# Patient Record
Sex: FEMALE | Race: WHITE | NOT HISPANIC OR LATINO | Employment: FULL TIME | URBAN - METROPOLITAN AREA
[De-identification: names, ages, dates, MRNs, and addresses within clinical notes are randomized per-mention and may not be internally consistent; named-entity substitution may affect disease eponyms.]

---

## 2017-08-16 ENCOUNTER — OFFICE VISIT (OUTPATIENT)
Dept: FAMILY MEDICINE | Facility: CLINIC | Age: 32
End: 2017-08-16
Payer: COMMERCIAL

## 2017-08-16 VITALS
SYSTOLIC BLOOD PRESSURE: 112 MMHG | HEART RATE: 77 BPM | BODY MASS INDEX: 20.65 KG/M2 | WEIGHT: 109.4 LBS | DIASTOLIC BLOOD PRESSURE: 72 MMHG | TEMPERATURE: 99.4 F | HEIGHT: 61 IN

## 2017-08-16 DIAGNOSIS — Z30.011 ENCOUNTER FOR INITIAL PRESCRIPTION OF CONTRACEPTIVE PILLS: ICD-10-CM

## 2017-08-16 DIAGNOSIS — Z00.00 ROUTINE GENERAL MEDICAL EXAMINATION AT A HEALTH CARE FACILITY: Primary | ICD-10-CM

## 2017-08-16 PROCEDURE — 99395 PREV VISIT EST AGE 18-39: CPT | Performed by: FAMILY MEDICINE

## 2017-08-16 RX ORDER — NORGESTIMATE AND ETHINYL ESTRADIOL 7DAYSX3 LO
1 KIT ORAL DAILY
Qty: 84 TABLET | Refills: 3 | Status: SHIPPED | OUTPATIENT
Start: 2017-08-16 | End: 2018-07-05

## 2017-08-16 NOTE — MR AVS SNAPSHOT
After Visit Summary   8/16/2017    Lluvia Silver    MRN: [id number]           Patient Information     Date Of Birth          1985        Visit Information        Provider Department      8/16/2017 8:00 AM Gerald Arreola MD [facility]        Today's Diagnoses     Routine general medical examination at a health care facility    -  1    Encounter for initial prescription of contraceptive pills          Care Instructions      Preventive Health Recommendations  Female Ages 26 - 39  Yearly exam:   See your health care provider every year in order to    Review health changes.     Discuss preventive care.      Review your medicines if you your doctor has prescribed any.    Until age 30: Get a Pap test every three years (more often if you have had an abnormal result).    After age 30: Talk to your doctor about whether you should have a Pap test every 3 years or have a Pap test with HPV screening every 5 years.   You do not need a Pap test if your uterus was removed (hysterectomy) and you have not had cancer.  You should be tested each year for STDs (sexually transmitted diseases), if you're at risk.   Talk to your provider about how often to have your cholesterol checked.  If you are at risk for diabetes, you should have a diabetes test (fasting glucose).  Shots: Get a flu shot each year. Get a tetanus shot every 10 years.   Nutrition:     Eat at least 5 servings of fruits and vegetables each day.    Eat whole-grain bread, whole-wheat pasta and brown rice instead of white grains and rice.    Talk to your provider about Calcium and Vitamin D.     Lifestyle    Exercise at least 150 minutes a week (30 minutes a day, 5 days of the week). This will help you control your weight and prevent disease.    Limit alcohol to one drink per day.    No smoking.     Wear sunscreen to prevent skin cancer.    See your dentist every six months for an exam and cleaning.            Follow-ups after your  "visit        Who to contact     If you have questions or need follow up information about today's clinic visit or your schedule please contact [facility] directly at [phone].  Normal or non-critical lab and imaging results will be communicated to you by MyChart, letter or phone within 4 business days after the clinic has received the results. If you do not hear from us within 7 days, please contact the clinic through MyChart or phone. If you have a critical or abnormal lab result, we will notify you by phone as soon as possible.  Submit refill requests through [facility] or call your pharmacy and they will forward the refill request to us. Please allow 3 business days for your refill to be completed.          Additional Information About Your Visit        [employer] Information     [facility] [facility] lets you send messages to your doctor, view your test results, renew your prescriptions, schedule appointments and more. To sign up, go to [email or web address] . Click on \"Log in\" on the left side of the screen, which will take you to the Welcome page. Then click on \"Sign up Now\" on the right side of the page.     You will be asked to enter the access code listed below, as well as some personal information. Please follow the directions to create your username and password.     Your access code is: [email or web address]  Expires: 2017  8:17 AM     Your access code will  in 90 days. If you need help or a new code, please call your [place] clinic or [phone].        Care EveryWhere ID     This is your Care EveryWhere ID. This could be used by other organizations to access your [facility] medical records  [id number]        Your Vitals Were     Pulse Temperature Height Last Period BMI (Body Mass Index)       77 99.4  F (37.4  C) (Oral) 5' 1\" (1.549 m) 2017 20.67 kg/m2        Blood Pressure from Last 3 Encounters:   17 112/72   08/15/16 108/68   10/28/15 118/74    Weight from Last 3 Encounters: "   08/16/17 109 lb 6.4 oz (49.6 kg)   08/15/16 108 lb 9.6 oz (49.3 kg)   10/28/15 109 lb 3.2 oz (49.5 kg)              Today, you had the following     No orders found for display         Today's Medication Changes          These changes are accurate as of: 8/16/17  8:17 AM.  If you have any questions, ask your nurse or doctor.               Start taking these medicines.        Dose/Directions    norgestim-eth estrad triphasic 0.18/0.215/0.25 MG-25 MCG per tablet   Commonly known as:  ORTHO TRI-CYCLEN LO   Used for:  Encounter for initial prescription of contraceptive pills   Started by:  Gerald Arreola MD        Dose:  1 tablet   Take 1 tablet by mouth daily   Quantity:  84 tablet   Refills:  3            Where to get your medicines      These medications were sent to [facility]/pharmacy #[id number] - [place], [place] - [address]  [address], [place] [address]     Phone:  [phone]     norgestim-eth estrad triphasic 0.18/0.215/0.25 MG-25 MCG per tablet                Primary Care Provider Office Phone # Fax #    Gerald Arreola [phone] [address] [facility] [address]        Equal Access to Services     HERMAN SHAHID AH: Hadii aad ku hadasho Soomaali, waaxda luqadaha, qaybta kaalmada adeegyada, waxay idiin hayaan bart hurtado. So [facility] [phone].    ATENCIÓN: Si habla español, tiene a caldwell disposición servicios gratuitos de asistencia lingüística. Llame al [phone].    We comply with applicable federal civil rights laws and [place] laws. We do not discriminate on the basis of race, color, national origin, age, disability sex, sexual orientation or gender identity.            Thank you!     Thank you for choosing [facility]  for your care. Our goal is always to provide you with excellent care. Hearing back from our patients is one way we can continue to improve our services. Please take a few minutes to complete the written  survey that you may receive in the mail after your visit with us. Thank you!             Your Updated Medication List - Protect others around you: Learn how to safely use, store and throw away your medicines at www.disposemymeds.org.          This list is accurate as of: 8/16/17  8:17 AM.  Always use your most recent med list.                   Brand Name Dispense Instructions for use Diagnosis    desogestrel-ethinyl estradiol 0.1/0.125/0.15 -0.025 MG per tablet    DARLEEN, VELIVET    84 tablet    Take 1 tablet by mouth daily    Menometrorrhagia       norgestim-eth estrad triphasic 0.18/0.215/0.25 MG-25 MCG per tablet    ORTHO TRI-CYCLEN LO    84 tablet    Take 1 tablet by mouth daily    Encounter for initial prescription of contraceptive pills

## 2017-08-16 NOTE — NURSING NOTE
"Chief Complaint   Patient presents with     Physical       Initial /72  Pulse 77  Temp 99.4  F (37.4  C) (Oral)  Ht 5' 1\" (1.549 m)  Wt 109 lb 6.4 oz (49.6 kg)  LMP 07/17/2017  BMI 20.67 kg/m2 Estimated body mass index is 20.67 kg/(m^2) as calculated from the following:    Height as of this encounter: 5' 1\" (1.549 m).    Weight as of this encounter: 109 lb 6.4 oz (49.6 kg).  Medication Reconciliation: complete  Maximiliano Castillo CMA    "

## 2017-08-16 NOTE — PROGRESS NOTES
SUBJECTIVE:   CC: Lluvia Silver is an 31 year old woman who presents for preventive health visit.     Healthy Habits:    Do you get at least three servings of calcium containing foods daily (dairy, green leafy vegetables, etc.)? No, 2    Amount of exercise or daily activities, outside of work: 5 day(s) per week    Problems taking medications regularly No    Medication side effects: No    Have you had an eye exam in the past two years? yes    Do you see a dentist twice per year? yes    Do you have sleep apnea, excessive snoring or daytime drowsiness?no               Today's PHQ-2 Score: PHQ-2 ( 1999 Pf[employer]) 8/16/2017 8/15/2016   Q1: Little interest or pleasure in doing things 0 0   Q2: Feeling down, depressed or hopeless 0 0   PHQ-2 Score 0 0         Abuse: Current or Past(Physical, Sexual or Emotional)- No  Do you feel safe in your environment - Yes  Social History   Substance Use Topics     Smoking status: Never Smoker     Smokeless tobacco: Never Used     Alcohol use Yes     The patient does not drink >3 drinks per day nor >7 drinks per week.    Reviewed orders with patient.  Reviewed health maintenance and updated orders accordingly - Yes       Mammogram not appropriate for this patient based on age.    Pertinent mammograms are reviewed under the imaging tab.  History of abnormal Pap smear: NO - age 21-29 PAP every 3 years recommended    Reviewed and updated as needed this visit by clinical staff  Tobacco  Allergies  Meds  Med Hx  Surg Hx  Fam Hx  Soc Hx        Reviewed and updated as needed this visit by Provider          ROS:  C: NEGATIVE for fever, chills, change in weight  I: NEGATIVE for worrisome rashes, moles or lesions  E: NEGATIVE for vision changes or irritation  ENT: NEGATIVE for ear, mouth and throat problems  R: NEGATIVE for significant cough or SOB  B: NEGATIVE for masses, tenderness or discharge  CV: NEGATIVE for chest pain, palpitations or peripheral edema  GI: NEGATIVE for nausea,  "abdominal pain, heartburn, or change in bowel habits  : NEGATIVE for unusual urinary or vaginal symptoms. Periods are regular.  M: NEGATIVE for significant arthralgias or myalgia  N: NEGATIVE for weakness, dizziness or paresthesias  P: NEGATIVE for changes in mood or affect    OBJECTIVE:   /72  Pulse 77  Temp 99.4  F (37.4  C) (Oral)  Ht 5' 1\" (1.549 m)  Wt 109 lb 6.4 oz (49.6 kg)  LMP 07/17/2017  BMI 20.67 kg/m2  EXAM:  GENERAL: healthy, alert and no distress  EYES: Eyes grossly normal to inspection, PERRL and conjunctivae and sclerae normal  HENT: ear canals and TM's normal, nose and mouth without ulcers or lesions  NECK: no adenopathy, no asymmetry, masses, or scars and thyroid normal to palpation  RESP: lungs clear to auscultation - no rales, rhonchi or wheezes  CV: regular rate and rhythm, normal S1 S2, no S3 or S4, no murmur, click or rub, no peripheral edema and peripheral pulses strong  ABDOMEN: soft, nontender, no hepatosplenomegaly, no masses and bowel sounds normal  MS: no gross musculoskeletal defects noted, no edema  SKIN: no suspicious lesions or rashes  NEURO: Normal strength and tone, mentation intact and speech normal  PSYCH: mentation appears normal, affect normal/bright    ASSESSMENT/PLAN:       ICD-10-CM    1. Routine general medical examination at a health care facility Z00.00    2. Encounter for initial prescription of contraceptive pills Z30.011 norgestim-eth estrad triphasic (ORTHO TRI-CYCLEN LO) 0.18/0.215/0.25 MG-25 MCG per tablet       COUNSELING:   Reviewed preventive health counseling, as reflected in patient instructions       Regular exercise       Healthy diet/nutrition         reports that she has never smoked. She has never used smokeless tobacco.    Estimated body mass index is 20.67 kg/(m^2) as calculated from the following:    Height as of this encounter: 5' 1\" (1.549 m).    Weight as of this encounter: 109 lb 6.4 oz (49.6 kg).         Counseling Resources:  ATP IV " Guidelines  Pooled Cohorts Equation Calculator  Breast Cancer Risk Calculator  FRAX Risk Assessment  ICSI Preventive Guidelines  Dietary Guidelines for Americans, 2010  [employer]'s MyPlate  ASA Prophylaxis  Lung CA Screening    Gerald Arreola MD  [facility]

## 2017-09-02 DIAGNOSIS — N92.1 MENOMETRORRHAGIA: ICD-10-CM

## 2018-07-05 ENCOUNTER — OFFICE VISIT (OUTPATIENT)
Dept: OBGYN | Facility: CLINIC | Age: 33
End: 2018-07-05
Payer: COMMERCIAL

## 2018-07-05 VITALS
HEIGHT: 61 IN | DIASTOLIC BLOOD PRESSURE: 70 MMHG | SYSTOLIC BLOOD PRESSURE: 110 MMHG | WEIGHT: 108 LBS | BODY MASS INDEX: 20.39 KG/M2

## 2018-07-05 DIAGNOSIS — Z30.011 ENCOUNTER FOR INITIAL PRESCRIPTION OF CONTRACEPTIVE PILLS: ICD-10-CM

## 2018-07-05 DIAGNOSIS — M79.641 RIGHT HAND PAIN: ICD-10-CM

## 2018-07-05 DIAGNOSIS — R21 SKIN RASH: ICD-10-CM

## 2018-07-05 DIAGNOSIS — Z01.419 ENCOUNTER FOR GYNECOLOGICAL EXAMINATION WITHOUT ABNORMAL FINDING: Primary | ICD-10-CM

## 2018-07-05 PROCEDURE — 99385 PREV VISIT NEW AGE 18-39: CPT | Performed by: OBSTETRICS & GYNECOLOGY

## 2018-07-05 RX ORDER — NORGESTIMATE AND ETHINYL ESTRADIOL 7DAYSX3 LO
1 KIT ORAL DAILY
Qty: 84 TABLET | Refills: 4 | Status: SHIPPED | OUTPATIENT
Start: 2018-07-05 | End: 2019-07-30

## 2018-07-05 RX ORDER — CETIRIZINE HYDROCHLORIDE 10 MG/1
10 TABLET ORAL DAILY
COMMUNITY
End: 2020-08-18

## 2018-07-05 ASSESSMENT — ANXIETY QUESTIONNAIRES
7. FEELING AFRAID AS IF SOMETHING AWFUL MIGHT HAPPEN: NOT AT ALL
1. FEELING NERVOUS, ANXIOUS, OR ON EDGE: NOT AT ALL
GAD7 TOTAL SCORE: 0
6. BECOMING EASILY ANNOYED OR IRRITABLE: NOT AT ALL
2. NOT BEING ABLE TO STOP OR CONTROL WORRYING: NOT AT ALL
5. BEING SO RESTLESS THAT IT IS HARD TO SIT STILL: NOT AT ALL
IF YOU CHECKED OFF ANY PROBLEMS ON THIS QUESTIONNAIRE, HOW DIFFICULT HAVE THESE PROBLEMS MADE IT FOR YOU TO DO YOUR WORK, TAKE CARE OF THINGS AT HOME, OR GET ALONG WITH OTHER PEOPLE: NOT DIFFICULT AT ALL
3. WORRYING TOO MUCH ABOUT DIFFERENT THINGS: NOT AT ALL

## 2018-07-05 ASSESSMENT — PATIENT HEALTH QUESTIONNAIRE - PHQ9: 5. POOR APPETITE OR OVEREATING: NOT AT ALL

## 2018-07-05 NOTE — MR AVS SNAPSHOT
After Visit Summary   7/5/2018    Lluvia Silver    MRN: [id number]           Patient Information     Date Of Birth          1985        Visit Information        Provider Department      7/5/2018 4:00 PM Kayce Beatty MD [facility]        Today's Diagnoses     Encounter for gynecological examination without abnormal finding    -  1    Skin rash        Right hand pain        Encounter for initial prescription of contraceptive pills           Follow-ups after your visit        Additional Services     DERMATOLOGY REFERRAL       Your provider has referred you to: [facility] [phone]   [email or web address]    Please be aware that coverage of these services is subject to the terms and limitations of your health insurance plan.  Call member services at your health plan with any benefit or coverage questions.      Please bring the following with you to your appointment:    (1) Any X-Rays, CTs or MRIs which have been performed.  Contact the facility where they were done to arrange for  prior to your scheduled appointment.    (2) List of current medications  (3) This referral request   (4) Any documents/labs given to you for this referral            [facility] PT, HAND, AND CHIROPRACTIC REFERRAL       **This order will print in the [facility] Scheduling Office**    Physical Therapy, Hand Therapy and Chiropractic Care are available through:    *[facility]  *[facility]  *[facility]    Call one number to schedule at any of the above locations: [phone].    Your provider has referred you to: Hand Therapy    Indication/Reason for Referral: Hand Pain  Onset of Illness: months  Therapy Orders: Evaluate and Treat  Special Programs: None  Special Request: None    Víctor Renee      Additional Comments for the Therapist or Chiropractor: Right hand pain when she bears weight on  "it.    Please be aware that coverage of these services is subject to the terms and limitations of your health insurance plan.  Call member services at your health plan with any benefit or coverage questions.      Please bring the following to your appointment:    *Your personal calendar for scheduling future appointments  *Comfortable clothing                  Who to contact     If you have questions or need follow up information about today's clinic visit or your schedule please contact [facility] directly at [phone].  Normal or non-critical lab and imaging results will be communicated to you by [employer], letter or phone within 4 business days after the clinic has received the results. If you do not hear from us within 7 days, please contact the clinic through [facility] or phone. If you have a critical or abnormal lab result, we will notify you by phone as soon as possible.  Submit refill requests through [employer] or call your pharmacy and they will forward the refill request to us. Please allow 3 business days for your refill to be completed.          Additional Information About Your Visit        [employer] Information     [employer] lets you send messages to your doctor, view your test results, renew your prescriptions, schedule appointments and more. To sign up, go to [email or web address] . Click on \"Log in\" on the left side of the screen, which will take you to the Welcome page. Then click on \"Sign up Now\" on the right side of the page.     You will be asked to enter the access code listed below, as well as some personal information. Please follow the directions to create your username and password.     Your access code is: [email or web address]  Expires: 10/3/2018  3:43 PM     Your access code will  in 90 days. If you need help or a new code, please call your [facility] or [phone].        Care EveryWhere ID     This is your Care EveryWhere ID. This could be used by other " "organizations to access your [place] [facility] medical records  [id number]        Your Vitals Were     Height Last Period BMI (Body Mass Index)             5' 1\" (1.549 m) 06/13/2018 20.41 kg/m2          Blood Pressure from Last 3 Encounters:   07/05/18 110/70   08/16/17 112/72   08/15/16 108/68    Weight from Last 3 Encounters:   07/05/18 108 lb (49 kg)   08/16/17 109 lb 6.4 oz (49.6 kg)   08/15/16 108 lb 9.6 oz (49.3 kg)              We Performed the Following     DERMATOLOGY REFERRAL     [facility], HAND, AND CHIR[facility]RACTIC REFERRAL          Where to get your medicines      These medications were sent to [facility]/pharmacy #[id number] - [place], [place] - [address]  [address], [place] [address]     Phone:  [phone]     norgestim-eth estrad triphasic 0.18/0.215/0.25 MG-25 MCG per tablet          Primary Care Provider Office Phone # Fax #    Gerald Arreola [phone] [address] [facility] [address]        Equal Access to Services     HERMAN SHAHID AH: Hadii tiffanie alejoo Somadiali, waaxda luqadaha, qaybta kaalmada adeegyada, jacob hurtado. So [facility] [phone].    ATENCIÓN: Si habla español, tiene a caldwell disposición servicios gratuitos de asistencia lingüística. [facility] [phone].    We comply with applicable federal civil rights laws and [place] laws. We do not discriminate on the basis of race, color, national origin, age, disability, sex, sexual orientation, or gender identity.            Thank you!     Thank you for choosing [facility]  for your care. Our goal is always to provide you with excellent care. Hearing back from our patients is one way we can continue to improve our services. Please take a few minutes to complete the written survey that you may receive in the mail after your visit with us. Thank you!             Your Updated Medication List - Protect others around you: Learn how to safely use, store and " throw away your medicines at www.disposemymeds.org.          This list is accurate as of 7/5/18  4:36 PM.  Always use your most recent med list.                   Brand Name Dispense Instructions for use Diagnosis    cetirizine 10 MG tablet    zyrTEC     Take 10 mg by mouth daily        norgestim-eth estrad triphasic 0.18/0.215/0.25 MG-25 MCG per tablet    ORTHO TRI-CYCLEN LO    84 tablet    Take 1 tablet by mouth daily    Encounter for initial prescription of contraceptive pills

## 2018-07-05 NOTE — PROGRESS NOTES
Lluvia is a 32 year old G0. female who presents for annual exam.     Besides routine health maintenance, she has several health concerns today .    HPI:  The patient's PCP is Gerald Arreola MD  She is tolerating her OCP well and would like a refill. She lives with a room mate and feels safe at home.  She works as a  and reports a high amount of stress from the job. A few weeks ago she started to have pain in her right hand when she puts weight on it when rising. She does not recall any injuries to her hand recently. She has also noted the recurrence of a rash on her chest and on her back that is not pruritis and seems to worsen with showering. She denies any new soaps or new lotions.    GYNECOLOGIC HISTORY:    Patient's last menstrual period was 2018.  Her current contraception method is: oral contraceptives.  She  reports that she has never smoked. She has never used smokeless tobacco.    Patient is sexually active.  STD testing offered?  Declined  Last PHQ-9 score on record =   PHQ-9 SCORE 2018   Total Score 0     Last GAD7 score on record =   ILIR-7 SCORE 2018   Total Score 0     Alcohol Score = 2    HEALTH MAINTENANCE:  Cholesterol: (  Cholesterol   Date Value Ref Range Status   2014 188 <200 mg/dL Final     Comment:     LDL Cholesterol is the primary guide to therapy.   The NCEP recommends further evaluation of: patients with cholesterol greater   than 200 mg/dL if additional risk factors are present, cholesterol greater   than   240 mg/dL, triglycerides greater than 150 mg/dL, or HDL less than 40 mg/dL.     Last Mammo: N/A, Result: not applicable, Next Mammo: Age 42  Pap: 8/15/16 NIL HPV Neg  Lab Results   Component Value Date    PAP NIL 08/15/2016    PAP ASC-US 2015    PAP NIL 2012   Colonoscopy:  N/A, Result: not applicable, Next Colonoscopy: Age 52  Dexa:  Never    Health maintenance updated:  yes    HISTORY:  Obstetric History       T0      L0      "SAB0   TAB0   Ectopic0   Multiple0   Live Births0           Patient Active Problem List   Diagnosis     CARDIOVASCULAR SCREENING; LDL GOAL LESS THAN 160     Menometrorrhagia     Acne     Scoliosis     ASCUS with positive high risk HPV cervical     Past Surgical History:   Procedure Laterality Date     NO HISTORY OF SURGERY        Social History   Substance Use Topics     Smoking status: Never Smoker     Smokeless tobacco: Never Used     Alcohol use Yes      Problem (# of Occurrences) Relation (Name,Age of Onset)    Alzheimer Disease (1) Maternal Grandmother    Chronic Obstructive Pulmonary Disease (1) Maternal Grandfather    HEART DISEASE (2) Maternal Grandfather, Paternal Grandfather    Leukemia (1) Maternal Grandfather            Current Outpatient Prescriptions   Medication Sig     cetirizine (ZYRTEC) 10 MG tablet Take 10 mg by mouth daily     norgestim-eth estrad triphasic (ORTHO TRI-CYCLEN LO) 0.18/0.215/0.25 MG-25 MCG per tablet Take 1 tablet by mouth daily     [DISCONTINUED] norgestim-eth estrad triphasic (ORTHO TRI-CYCLEN LO) 0.18/0.215/0.25 MG-25 MCG per tablet Take 1 tablet by mouth daily     No current facility-administered medications for this visit.      No Known Allergies    Past medical, surgical, social and family histories were reviewed and updated in EPIC.    ROS:   12 point review of systems negative other than symptoms noted below.  Skin: Rash    EXAM:  /70  Ht 5' 1\" (1.549 m)  Wt 108 lb (49 kg)  LMP 06/13/2018  BMI 20.41 kg/m2   BMI: Body mass index is 20.41 kg/(m^2).    PHYSICAL EXAM:  Constitutional:  Appearance: Well nourished, well developed, alert, in no acute distress  Neck:  Lymph Nodes:  No lymphadenopathy present    Thyroid:  Gland size normal, nontender, no nodules or masses present on palpation  Chest:  Respiratory Effort:  Breathing unlabored, CTAB  Cardiovascular:    Heart: Auscultation:  Regular rate, normal rhythm, no murmurs present  Breasts: Palpation of Breasts and " Axillae:  No masses present on palpation, no breast tenderness., Axillary Lymph Nodes:  No lymphadenopathy present. and No nodularity, asymmetry or nipple discharge bilaterally.  Gastrointestinal:   Abdominal Examination:  Abdomen nontender to palpation, tone normal without rigidity or guarding, no masses present, umbilicus without lesions   Liver and Spleen:  No hepatomegaly present, liver nontender to palpation    Hernias:  No hernias present  Lymphatic: Lymph Nodes:  No other lymphadenopathy present  Skin:  General Inspection:  scattered hypopigmented rash on her back and on her chest along her ribs.    Genitalia and Groin:  No rashes present, no lesions present, no areas of  discoloration, no masses present  Neurologic/Psychiatric:    Mental Status:  Oriented X3     Pelvic Exam:  External Genitalia:     Normal appearance for age, no discharge present, no tenderness present, no inflammatory lesions present, color normal  Vagina:     Normal vault. Non-tender  Bladder:     Nontender to palpation  Urethra:   Urethral Body:  Urethra palpation normal, urethra structural support normal   Urethral Meatus:  No erythema or lesions present  Cervix:     Non-tender  Uterus:     Uterus: firm, normal sized and nontender, anteverted in position.   Adnexa:     No adnexal tenderness present, no adnexal masses present  Perineum:     Perineum within normal limits, no evidence of trauma, no rashes or skin lesions present  Genitalia and Groin:     No rashes present, no lesions present, no areas of discoloration, no masses present      COUNSELING:   Reviewed preventive health counseling, as reflected in patient instructions       Contraception    BMI: Body mass index is 20.41 kg/(m^2).      ASSESSMENT:  32 year old female with satisfactory annual exam.    ICD-10-CM    1. Encounter for gynecological examination without abnormal finding Z01.419    2. Skin rash R21 DERMATOLOGY REFERRAL   3. Right hand pain M79.641 [facility], [facility]  CHIROPRACTIC REFERRAL   4. Encounter for initial prescription of contraceptive pills Z30.011 norgestim-eth estrad triphasic (ORTHO TRI-CYCLEN LO) 0.18/0.215/0.25 MG-25 MCG per tablet       PLAN:  Pap smear indicated in August of 2019 given her prior abnormal pap in 2015 with ASCUS/HR HPV    OK to continue on current OCP.    Right hand pain: PT hand referral placed. Patient asked to call in for an appointment if she is not contacted in 1 week.    Referral placed for Dermatology. Unknown etiology of the rash on her skin. Will defer to a Dermatologist for diagnosis and treatment if needed.    Kayce Beatty MD

## 2018-07-06 ASSESSMENT — ANXIETY QUESTIONNAIRES: GAD7 TOTAL SCORE: 0

## 2018-07-06 ASSESSMENT — PATIENT HEALTH QUESTIONNAIRE - PHQ9: SUM OF ALL RESPONSES TO PHQ QUESTIONS 1-9: 0

## 2018-07-16 ENCOUNTER — THERAPY VISIT (OUTPATIENT)
Dept: OCCUPATIONAL THERAPY | Facility: CLINIC | Age: 33
End: 2018-07-16
Payer: COMMERCIAL

## 2018-07-16 DIAGNOSIS — M79.641 PAIN OF RIGHT HAND: Primary | ICD-10-CM

## 2018-07-16 PROCEDURE — 97140 MANUAL THERAPY 1/> REGIONS: CPT | Mod: GO | Performed by: OCCUPATIONAL THERAPIST

## 2018-07-16 PROCEDURE — 97110 THERAPEUTIC EXERCISES: CPT | Mod: GO | Performed by: OCCUPATIONAL THERAPIST

## 2018-07-16 PROCEDURE — 97112 NEUROMUSCULAR REEDUCATION: CPT | Mod: GO | Performed by: OCCUPATIONAL THERAPIST

## 2018-07-16 PROCEDURE — 97165 OT EVAL LOW COMPLEX 30 MIN: CPT | Mod: GO | Performed by: OCCUPATIONAL THERAPIST

## 2018-07-16 NOTE — PROGRESS NOTES
Hand Therapy Initial Evaluation  Current Date:  7/16/2018    Subjective:  Lluvia Silver is a 32 year old R hand dominant female.    Diagnosis: Hand pain  DOI:  Unsure, began mid May      Patient reports symptoms of pain of the R hand, wrist and forearm which occurred due to Unsure. Since onset symptoms are gradually getting worse. Special tests:  none.  Previous treatment: none. General health as reported by patient is good.  Pertinent medical history includes: Migraines/Headaches, Change in Skin Color, scoliosis.  Medical allergies: none.  Surgical history: none.  Medication history: birth control, allergy.    Occupational Profile Information:  Current occupation is   Currently working in normal job without restrictions  Job Tasks: Computer Work, Driving, Prolonged Sitting, Repetitive Tasks  Prior functional level:  no limitations  Barriers include:none  Mobility: No difficulty  Transportation: drives  Leisure activities/hobbies: yoga, working out, side business with [employer]    Functional Outcome Measure:   Upper Extremity Functional Index Score:  SCORE:   Column Totals: /80: 69   (A lower score indicates greater disability.)    Objective:  Pain Level Report  VAS(0-10) 7/16/2018   At Rest: 1   With Use: 5-6     Report of Pain:  Location:  wrist, hand, index finger and forearm, over first dorsal compartment  Pain Quality:  Aching and Throbbing  Frequency: constant    Pain is worst:  daytime  Exacerbated by:  Lifting, pushing up off hand, yoga position leaning on hand  Relieved by:  Wrapping it  Progression:  Gradually worse  Edema:  NONE  Sensation: WNL throughout all nerve distributions; per patient report    ROM:  Range of Motion Wrist AROM (PROM):  Date 7/16/2018 7/16/2018   Side R L   Ext 64 80   Flex 73 90   UD 30 40   RD 15 20   Sup WNL WNL   Pro WNL WNL     Resisted Testing: MMT on scale 0-5/5, Pain level report on scale 0-10/10  Date 7/16/2018    Side R    Sup 5/5, 0/10    Pro 5/5, 0/10     Wrist Ext 5/5, 0/10    Wrist Flex 5/5, 0/10    Radial Deviation 5/5, 0/10    Ulnar Deviation 5/5, 0/10      Palpation Radial Nerve Path: Pain level report on scale 0-10/10  Date 7/16/2018    Side R    Supraspinatus 0    Triangular Interval 0    Spiral Groove 0    Radial Head 0    PIN 0    DSRN 2-3      AROM Multi-joint:  Date 7/16/2018 7/16/2018   Side R L   Elbow - FA - Wrist     90 - Neut - Flex 66 79   90 -  Pro  - Flex 80 85   Ext - Neut - Flex 60 70   Ext -  Pro  - Flex 60 73     Special Tests:  Date 7/16/2018    Side R    Resisted Long Finger Test 0    Tinels at DRSN 0    Tinels at PIN site 0    Radial Nerve ULTT Tension noted ~25% glide    Paresthesias -        STRENGTH: (Measured in pounds, pain scale 0-10/10)  Not tested    Assessment:  Patient presents with symptoms consistent with diagnosis of R hand pain,  with conservative intervention.     Patient's limitations or Problem List includes:  Pain, Decreased ROM/motion and Tightness in musculature of the right wrist, hand and arm which interferes with the patient's ability to perform Self Care Tasks (dressing, eating), Work Tasks, Recreational Activities and Household Chores as compared to previous level of function.    Rehab Potential:  Excellent - Return to full activity, no limitations    Patient will benefit from skilled Occupational Therapy to increase ROM and decrease pain to return to previous activity level and resume normal daily tasks and to reach their rehab potential.    Barriers to Learning:  No barrier    Communication Issues:  Patient appears to be able to clearly communicate and understand verbal and written communication and follow directions correctly.    Chart Review: Chart Review, Brief history including review of medical and/or therapy records relating to the presenting problem and Simple history review with patient    Identified Performance Deficits: health management and maintenance, home establishment and management, shopping, work  and leisure activities    Assessment of Occupational Performance:  5 or more Performance Deficits    Clinical Decision Making (Complexity): Low complexity    Treatment Explanation:  The following has been discussed with the patient:  RX ordered/plan of care  Anticipated outcomes  Possible risks and side effects    Plan:  Frequency:  1 X week, once daily  Duration:  for 8 weeks    Treatment Plan:   Modalities:  US  Therapeutic Exercise:  AROM, PROM and Tendon Gliding  Neuromuscular re-education:  Nerve Gliding and Kinesiotaping  Manual Techniques:  Myofascial release  Discharge Plan:  Achieve all LTG.  Independent in home treatment program.  Reach maximal therapeutic benefit.    Home Exercise Program:  Radial nerve glides  Forearm stretch  Warmth    Next Visit:  MFR  Radial nerve gliding

## 2018-07-25 ENCOUNTER — THERAPY VISIT (OUTPATIENT)
Dept: OCCUPATIONAL THERAPY | Facility: CLINIC | Age: 33
End: 2018-07-25
Payer: COMMERCIAL

## 2018-07-25 DIAGNOSIS — M79.641 PAIN OF RIGHT HAND: ICD-10-CM

## 2018-07-25 PROCEDURE — 97140 MANUAL THERAPY 1/> REGIONS: CPT | Mod: GO | Performed by: OCCUPATIONAL THERAPIST

## 2018-07-25 PROCEDURE — 97112 NEUROMUSCULAR REEDUCATION: CPT | Mod: GO | Performed by: OCCUPATIONAL THERAPIST

## 2018-08-01 ENCOUNTER — THERAPY VISIT (OUTPATIENT)
Dept: OCCUPATIONAL THERAPY | Facility: CLINIC | Age: 33
End: 2018-08-01
Payer: COMMERCIAL

## 2018-08-01 DIAGNOSIS — M79.641 PAIN OF RIGHT HAND: ICD-10-CM

## 2018-08-01 PROCEDURE — 97140 MANUAL THERAPY 1/> REGIONS: CPT | Mod: GO | Performed by: OCCUPATIONAL THERAPIST

## 2018-08-01 PROCEDURE — 97112 NEUROMUSCULAR REEDUCATION: CPT | Mod: GO | Performed by: OCCUPATIONAL THERAPIST

## 2018-08-08 ENCOUNTER — THERAPY VISIT (OUTPATIENT)
Dept: OCCUPATIONAL THERAPY | Facility: CLINIC | Age: 33
End: 2018-08-08
Payer: COMMERCIAL

## 2018-08-08 DIAGNOSIS — M79.641 PAIN OF RIGHT HAND: ICD-10-CM

## 2018-08-08 PROCEDURE — 97112 NEUROMUSCULAR REEDUCATION: CPT | Mod: GO | Performed by: OCCUPATIONAL THERAPIST

## 2018-08-08 PROCEDURE — 97140 MANUAL THERAPY 1/> REGIONS: CPT | Mod: GO | Performed by: OCCUPATIONAL THERAPIST

## 2018-08-15 ENCOUNTER — THERAPY VISIT (OUTPATIENT)
Dept: OCCUPATIONAL THERAPY | Facility: CLINIC | Age: 33
End: 2018-08-15
Payer: COMMERCIAL

## 2018-08-15 DIAGNOSIS — M79.641 PAIN OF RIGHT HAND: ICD-10-CM

## 2018-08-15 PROCEDURE — 97112 NEUROMUSCULAR REEDUCATION: CPT | Mod: GO | Performed by: OCCUPATIONAL THERAPIST

## 2018-08-15 PROCEDURE — 97140 MANUAL THERAPY 1/> REGIONS: CPT | Mod: GO | Performed by: OCCUPATIONAL THERAPIST

## 2018-08-28 ENCOUNTER — THERAPY VISIT (OUTPATIENT)
Dept: OCCUPATIONAL THERAPY | Facility: CLINIC | Age: 33
End: 2018-08-28
Payer: COMMERCIAL

## 2018-08-28 DIAGNOSIS — M79.641 PAIN OF RIGHT HAND: ICD-10-CM

## 2018-08-28 PROCEDURE — 97140 MANUAL THERAPY 1/> REGIONS: CPT | Mod: GO | Performed by: OCCUPATIONAL THERAPIST

## 2018-08-28 PROCEDURE — 97112 NEUROMUSCULAR REEDUCATION: CPT | Mod: GO | Performed by: OCCUPATIONAL THERAPIST

## 2018-08-28 PROCEDURE — 97110 THERAPEUTIC EXERCISES: CPT | Mod: GO | Performed by: OCCUPATIONAL THERAPIST

## 2018-09-11 ENCOUNTER — THERAPY VISIT (OUTPATIENT)
Dept: OCCUPATIONAL THERAPY | Facility: CLINIC | Age: 33
End: 2018-09-11
Payer: COMMERCIAL

## 2018-09-11 DIAGNOSIS — M79.641 PAIN OF RIGHT HAND: ICD-10-CM

## 2018-09-11 PROCEDURE — 97112 NEUROMUSCULAR REEDUCATION: CPT | Mod: GO | Performed by: OCCUPATIONAL THERAPIST

## 2018-09-11 PROCEDURE — 97140 MANUAL THERAPY 1/> REGIONS: CPT | Mod: GO | Performed by: OCCUPATIONAL THERAPIST

## 2018-09-11 PROCEDURE — 97110 THERAPEUTIC EXERCISES: CPT | Mod: GO | Performed by: OCCUPATIONAL THERAPIST

## 2018-09-11 NOTE — PROGRESS NOTES
Hand Therapy Progress Note  Current Date:  9/11/2018    Reporting Period: 7/16/2018 through 9/11/2018    Diagnosis: Hand pain  DOI:  Unsure, began mid May    Initial History:  Patient reports symptoms of pain of the R hand, wrist and forearm which occurred due to Unsure. Since onset symptoms are gradually getting worse. Special tests:  none.  Previous treatment: none. General health as reported by patient is good.  Pertinent medical history includes: Migraines/Headaches, Change in Skin Color, scoliosis.  Medical allergies: none.  Surgical history: none.  Medication history: birth control, allergy.    Occupational Profile Information:  Current occupation is   Currently working in normal job without restrictions  Job Tasks: Computer Work, Driving, Prolonged Sitting, Repetitive Tasks  Prior functional level:  no limitations  Barriers include:none  Mobility: No difficulty  Transportation: drives  Leisure activities/hobbies: yoga, working out, side business with [employer]    Upper Extremity Functional Index Score:  SCORE:   Column Totals: /80: 75   (A lower score indicates greater disability.)    S:  Subjective changes as noted by patient: things have been feeling pretty good! I did some yoga and burpees and the hand didn't really even bother me. My forearm here (extensors) were a little sore though, but I think its because I haven't done those exercises for awhile.   Functional changes noted by patient: Improvement in Recreational Activities  Response to previous treatment: good  Patient has noted adverse reaction to: None    Objective:  Pain Level Report  VAS(0-10) 7/16/2018 9/11/18   At Rest: 1 0   With Use: 5-6 0-3     Report of Pain:  Location:  Dorsal MCPJ's  Pain Quality:  sharp  Frequency: constant    Pain is worst:  daytime  Exacerbated by:  Lifting, pushing up off hand, yoga position leaning on hand  Relieved by:  Wrapping it  Progression:  resolving  Edema:  NONE  Sensation: WNL throughout all  nerve distributions; per patient report    ROM:  Range of Motion Wrist AROM (PROM):  Date 7/16/2018 7/16/2018 9/11/18   Side R L R   Ext 64 80 77   Flex 73 90 85   UD 30 40 40   RD 15 20 20   Sup WNL WNL WNL   Pro WNL WNL WNL     Resisted Testing: MMT on scale 0-5/5, Pain level report on scale 0-10/10  Date 7/16/2018   Side R   Sup 5/5, 0/10   Pro 5/5, 0/10   Wrist Ext 5/5, 0/10   Wrist Flex 5/5, 0/10   Radial Deviation 5/5, 0/10   Ulnar Deviation 5/5, 0/10     Palpation Radial Nerve Path: Pain level report on scale 0-10/10  Date 7/16/2018 9/11/18   Side R R   Supraspinatus 0 0   Triangular Interval 0 0   Spiral Groove 0 3   Radial Head 0 0   PIN 0 3   DSRN 2-3 0     AROM Multi-joint:  Date 7/16/2018 7/16/2018   Side R L   Elbow - FA - Wrist     90 - Neut - Flex 66 79   90 -  Pro  - Flex 80 85   Ext - Neut - Flex 60 70   Ext -  Pro  - Flex 60 73     Special Tests:  Date 7/16/2018 9/11/18   Side R R   Resisted Long Finger Test 0 0   Tinels at DRSN 0 0   Tinels at PIN site 0 0   Radial Nerve ULTT Tension noted ~25% glide Tension noted at ~75% of glide   Paresthesias - -        and Pinch Strength (pounds)  9/11 Date: 9/11   Left    Side  Right   42        # 1                # 2                # 3         Average 36   11  3 Point  # 1               # 2               # 3        Average 12   9  Lateral  # 1                # 2                # 3         Average 9     Assessment:  Response to therapy has been improvement to:  ROM of Wrist:  Ext  and Flex  Pain:  frequency is less, intensity of pain is decreased, duration of pain is decreased and less tender over affected area  Overall Assessment:  Patient's symptoms are resolving.  Patient would benefit from continued therapy to achieve rehab potential  STG/LTG:  See goal sheet for details and updates of remaining functional limitations.     Plan:  I have re-evaluated this patient and find that the nature, scope, duration and intensity of the therapy is appropriate  for the medical condition of the patient.  Frequency:  1 X month, once daily  Duration:  for 2 additional months    Home Exercise Program:  Radial nerve glides  Forearm stretches  Warmth    Next Visit:  MFR  Stretches/tendon glides  Radial nerve gliding  DC to HEP?    Please refer to the daily flowsheet for treatment today, total treatment time and time spent performing 1:1 timed codes.

## 2018-10-16 ENCOUNTER — THERAPY VISIT (OUTPATIENT)
Dept: OCCUPATIONAL THERAPY | Facility: CLINIC | Age: 33
End: 2018-10-16
Payer: COMMERCIAL

## 2018-10-16 DIAGNOSIS — M79.641 PAIN OF RIGHT HAND: ICD-10-CM

## 2018-10-16 PROCEDURE — 97140 MANUAL THERAPY 1/> REGIONS: CPT | Mod: GO | Performed by: OCCUPATIONAL THERAPIST

## 2018-10-16 PROCEDURE — 97112 NEUROMUSCULAR REEDUCATION: CPT | Mod: GO | Performed by: OCCUPATIONAL THERAPIST

## 2018-10-16 PROCEDURE — 97110 THERAPEUTIC EXERCISES: CPT | Mod: GO | Performed by: OCCUPATIONAL THERAPIST

## 2018-10-16 NOTE — PROGRESS NOTES
Hand Therapy Discharge Note  Current Date:  10/16/2018    Reporting Period: 9/11/2018 through 10/16/2018    Diagnosis: Hand pain  DOI:  Unsure, began mid May    Initial History:  Patient reports symptoms of pain of the R hand, wrist and forearm which occurred due to Unsure. Since onset symptoms are gradually getting worse. Special tests:  none.  Previous treatment: none. General health as reported by patient is good.  Pertinent medical history includes: Migraines/Headaches, Change in Skin Color, scoliosis.  Medical allergies: none.  Surgical history: none.  Medication history: birth control, allergy.    Occupational Profile Information:  Current occupation is   Currently working in normal job without restrictions  Job Tasks: Computer Work, Driving, Prolonged Sitting, Repetitive Tasks  Prior functional level:  no limitations  Barriers include:none  Mobility: No difficulty  Transportation: drives  Leisure activities/hobbies: yoga, working out, side business with [employer]    Upper Extremity Functional Index Score:  SCORE:   Column Totals: /80: 77   (A lower score indicates greater disability.)    S:  Subjective changes as noted by patient: I've been doing more exercise that involves the arm (push ups, burpees) and I can feel it in my hand but it gets better.   Functional changes noted by patient: Improvement in Recreational Activities  Response to previous treatment: good  Patient has noted adverse reaction to: None    Objective:  Pain Level Report  VAS(0-10) 7/16/2018 9/11/18 10/16/18   At Rest: 1 0 0   With Use: 5-6 0-3 0-3     Report of Pain:  Location:  Dorsal MCPJ's  Pain Quality:  Sharp - near end of workout  Frequency: constant    Pain is worst:  daytime  Exacerbated by:  Workouts - push ups, burpees, comandos  Relieved by:  Wrapping it  Progression:  Resolving   Edema:  NONE  Sensation: WNL throughout all nerve distributions; per patient report    ROM:  Range of Motion Wrist AROM (PROM):  Date  7/16/2018 7/16/2018 9/11/18 10/16/18   Side R L R R   Ext 64 80 77 80   Flex 73 90 85 85   UD 30 40 40 40   RD 15 20 20 20   Sup WNL WNL WNL wnl   Pro WNL WNL WNL wnl     Resisted Testing: MMT on scale 0-5/5, Pain level report on scale 0-10/10  Date 7/16/2018   Side R   Sup 5/5, 0/10   Pro 5/5, 0/10   Wrist Ext 5/5, 0/10   Wrist Flex 5/5, 0/10   Radial Deviation 5/5, 0/10   Ulnar Deviation 5/5, 0/10     Palpation Radial Nerve Path: Pain level report on scale 0-10/10  Date 7/16/2018 9/11/18 10/16/18   Side R R R   Supraspinatus 0 0    Triangular Interval 0 0    Spiral Groove 0 3 0   Radial Head 0 0    PIN 0 3 2   DSRN 2-3 0 0     AROM Multi-joint:  Date 7/16/2018 7/16/2018   Side R L   Elbow - FA - Wrist     90 - Neut - Flex 66 79   90 -  Pro  - Flex 80 85   Ext - Neut - Flex 60 70   Ext -  Pro  - Flex 60 73     Special Tests:  Date 7/16/2018 9/11/18 10/16/18   Side R R R   Resisted Long Finger Test 0 0    Tinels at DRSN 0 0    Tinels at PIN site 0 0    Radial Nerve ULTT Tension noted ~25% glide Tension noted at ~75% of glide WNL   Paresthesias - - -        and Pinch Strength (pounds)  9/11 Date: 9/11 10/16   Left    Side  Right Right   42        # 1                # 2                # 3         Average 36 40   11  3 Point  # 1               # 2               # 3        Average 12 15   9  Lateral  # 1                # 2                # 3         Average 9 13     Assessment:  Response to therapy has been improvement to:  Strength:   and pinch  Pain:  frequency is less, intensity of pain is decreased, duration of pain is decreased and less tender over affected area  Overall Assessment:  Patient is progressing well and is ready to discharge to independent home exercise program.  STG/LTG:  See goal sheet for details and updates of remaining functional limitations.     Plan:  Discharge to home exercise program    Home Exercise Program:  Radial nerve glides  Forearm stretches  Warmth    Please refer to the  daily flowsheet for treatment today, total treatment time and time spent performing 1:1 timed codes.

## 2019-07-26 NOTE — PROGRESS NOTES
Lluvia is a 33 year old  female who presents for annual exam.     Besides routine health maintenance, she has no other health concerns today .    HPI:  The patient's PCP is Kayce Beatty MD.  Doing well. Likes her Orth Tricyclen Lo. Would like fasting labs today. Due for pap due to her history of LULU 2. Denies intimate partner violence from her partner. He lives in [place]. 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 The had pain from last year has resolved. She has also had interval resolution of her sciatic pain due to working with a  via work.      GYNECOLOGIC HISTORY:    Patient's last menstrual period was 2019 (exact date).  Her current contraception method is: oral contraceptives.  She  reports that she has never smoked. She has never used smokeless tobacco.    Patient is sexually active.  STD testing offered?  Accepted  Last PHQ-9 score on record =   PHQ-9 SCORE 2019   PHQ-9 Total Score 1     Last GAD7 score on record =   ILIR-7 SCORE 2019   Total Score 2     Alcohol Score = 2    HEALTH MAINTENANCE:  Cholesterol: 2014   Total= 188, Triglycerides=193, HDL=46, XUU=190  Cholesterol   Date Value Ref Range Status   2014 188 <200 mg/dL Final     Comment:     LDL Cholesterol is the primary guide to therapy.   The NCEP recommends further evaluation of: patients with cholesterol greater   than 200 mg/dL if additional risk factors are present, cholesterol greater   than   240 mg/dL, triglycerides greater than 150 mg/dL, or HDL less than 40 mg/dL.        Last Mammo: NA, Result: not applicable, Next Mammo: 9 years.  Pap: 8/15/2016 NIL, HPV-  Lab Results   Component Value Date    PAP NIL 08/15/2016    PAP ASC-US 2015    PAP NIL 2012      Colonoscopy:  NA, Result: not applicable, Next Colonoscopy: 19 years.  Dexa:  NA    Health maintenance updated:  yes    HISTORY:  OB History    Para Term  AB Living   0 0 0 0 0 0   SAB TAB Ectopic  "Multiple Live Births   0 0 0 0 0       Patient Active Problem List   Diagnosis     CARDIOVASCULAR SCREENING; LDL GOAL LESS THAN 160     Menometrorrhagia     Acne     Scoliosis     ASCUS with positive high risk HPV cervical     Past Surgical History:   Procedure Laterality Date     NO HISTORY OF SURGERY        Social History     Tobacco Use     Smoking status: Never Smoker     Smokeless tobacco: Never Used   Substance Use Topics     Alcohol use: Yes      Problem (# of Occurrences) Relation (Name,Age of Onset)    Alzheimer Disease (1) Maternal Grandmother    Chronic Obstructive Pulmonary Disease (1) Maternal Grandfather    Heart Disease (2) Maternal Grandfather, Paternal Grandfather    Hyperlipidemia (1) Paternal Grandmother    Hypertension (1) Paternal Grandmother    Leukemia (1) Maternal Grandfather            Current Outpatient Medications   Medication Sig     cetirizine (ZYRTEC) 10 MG tablet Take 10 mg by mouth daily     norgestim-eth estrad triphasic (ORTHO TRI-CYCLEN LO) 0.18/0.215/0.25 MG-25 MCG per tablet Take 1 tablet by mouth daily     No current facility-administered medications for this visit.      No Known Allergies    Past medical, surgical, social and family histories were reviewed and updated in EPIC.    ROS:   12 point review of systems negative other than symptoms noted below.  Constitutional: Fatigue  Gastrointestinal: Bloating and Constipation  Neurologic: Headaches    EXAM:  /64   Pulse 70   Ht 1.549 m (5' 1\")   Wt 49.1 kg (108 lb 3.2 oz)   LMP 07/09/2019 (Exact Date)   Breastfeeding? No   BMI 20.44 kg/m     BMI: Body mass index is 20.44 kg/m .    PHYSICAL EXAM:  Constitutional:  Appearance: Well nourished, well developed, alert, in no acute distress  Neck:  Lymph Nodes:  No lymphadenopathy present    Thyroid:  Gland size normal, nontender, no nodules or masses present on palpation  Chest:  Respiratory Effort:  Breathing unlabored, CTAB  Cardiovascular:    Heart: Auscultation:  Regular " rate, normal rhythm, no murmurs present  Breasts: Palpation of Breasts and Axillae:  No masses present on palpation, no breast tenderness., Axillary Lymph Nodes:  No lymphadenopathy present. and No nodularity, asymmetry or nipple discharge bilaterally.  Gastrointestinal:   Abdominal Examination:  Abdomen nontender to palpation, tone normal without rigidity or guarding, no masses present, umbilicus without lesions   Liver and Spleen:  No hepatomegaly present, liver nontender to palpation    Hernias:  No hernias present  Lymphatic: Lymph Nodes:  No other lymphadenopathy present  Skin:  General Inspection:  No rashes present, no lesions present, no areas of  discoloration    Genitalia and Groin:  No rashes present, no lesions present, no areas of  discoloration, no masses present  Neurologic/Psychiatric:    Mental Status:  Oriented X3     Pelvic Exam:  External Genitalia:     Normal appearance for age, no discharge present, no tenderness present, no inflammatory lesions present, color normal  Vagina:     Normal vaginal vault without central or paravaginal defects, no discharge present, no inflammatory lesions present, no masses present  Bladder:     Nontender to palpation  Urethra:   Urethral Body:  Urethra palpation normal, urethra structural support normal   Urethral Meatus:  No erythema or lesions present  Cervix:     Appearance healthy, no lesions present, nontender to palpation, no bleeding present  Uterus:     Uterus: firm, normal sized and nontender, anteverted in position.   Adnexa:     No adnexal tenderness present, no adnexal masses present  Perineum:     Perineum within normal limits, no evidence of trauma, no rashes or skin lesions present  Pubic Hair:     none  Genitalia and Groin:     No rashes present, no lesions present, no areas of discoloration, no masses present      COUNSELING:   Reviewed preventive health counseling, as reflected in patient instructions       Healthy diet/nutrition    BMI: Body  mass index is 20.44 kg/m .      ASSESSMENT:  33 year old female with satisfactory annual exam.    ICD-10-CM    1. Encounter for gynecological examination without abnormal finding Z01.419 Pap imaged thin layer screen with HPV - recommended age 30 - 65     HPV High Risk Types DNA Cervical   2. Screen for STD (sexually transmitted disease) Z11.3 NEISSERIA GONORRHOEA PCR   3. Screening for chlamydial disease Z11.8 CHLAMYDIA TRACHOMATIS PCR   4. Screening for cardiovascular condition Z13.6    5. Screening for metabolic disorder Z13.228    6. Encounter for initial prescription of contraceptive pills Z30.011        PLAN:  Pap smear performed today  Will order fasting labs.  Declined serological STI testing.   Ok to continue on her OCP.    Kayce Beatty MD  Screening Questionnaire for Adult Immunization    Are you sick today?   No   Do you have allergies to medications, food, a vaccine component or latex?   No   Have you ever had a serious reaction after receiving a vaccination?   No   Do you have a long-term health problem with heart disease, lung disease, asthma, kidney disease, metabolic disease (e.g. diabetes), anemia, or other blood disorder?   No   Do you have cancer, leukemia, HIV/AIDS, or any other immune system problem?   No   In the past 3 months, have you taken medications that affect  your immune system, such as prednisone, other steroids, or anticancer drugs; drugs for the treatment of rheumatoid arthritis, Crohn s disease, or psoriasis; or have you had radiation treatments?   No   Have you had a seizure, or a brain or other nervous system problem?   No   During the past year, have you received a transfusion of blood or blood     products, or been given immune (gamma) globulin or antiviral drug?   No   For women: Are you pregnant or is there a chance you could become        pregnant during the next month?   No   Have you received any vaccinations in the past 4 weeks?   No     Immunization questionnaire  answers were all negative.        Per orders of Dr. Beatty, injection of TDAP given by SONALI RAJPUT. Patient instructed to remain in clinic for 15 minutes afterwards, and to report any adverse reaction to me immediately.       Screening performed by SONALI RAJPUT on 7/30/2019 at 9:28 AM.

## 2019-07-30 ENCOUNTER — OFFICE VISIT (OUTPATIENT)
Dept: OBGYN | Facility: CLINIC | Age: 34
End: 2019-07-30
Payer: COMMERCIAL

## 2019-07-30 ENCOUNTER — RESULT FOLLOW UP (OUTPATIENT)
Dept: OBGYN | Facility: CLINIC | Age: 34
End: 2019-07-30

## 2019-07-30 VITALS
BODY MASS INDEX: 20.43 KG/M2 | WEIGHT: 108.2 LBS | DIASTOLIC BLOOD PRESSURE: 64 MMHG | HEART RATE: 70 BPM | SYSTOLIC BLOOD PRESSURE: 108 MMHG | HEIGHT: 61 IN

## 2019-07-30 DIAGNOSIS — Z13.6 SCREENING FOR CARDIOVASCULAR CONDITION: ICD-10-CM

## 2019-07-30 DIAGNOSIS — Z11.8 SCREENING FOR CHLAMYDIAL DISEASE: ICD-10-CM

## 2019-07-30 DIAGNOSIS — Z13.228 SCREENING FOR METABOLIC DISORDER: ICD-10-CM

## 2019-07-30 DIAGNOSIS — Z01.419 ENCOUNTER FOR GYNECOLOGICAL EXAMINATION WITHOUT ABNORMAL FINDING: Primary | ICD-10-CM

## 2019-07-30 DIAGNOSIS — E55.9 VITAMIN D DEFICIENCY: ICD-10-CM

## 2019-07-30 DIAGNOSIS — Z30.011 ENCOUNTER FOR INITIAL PRESCRIPTION OF CONTRACEPTIVE PILLS: ICD-10-CM

## 2019-07-30 DIAGNOSIS — Z23 NEED FOR TDAP VACCINATION: ICD-10-CM

## 2019-07-30 DIAGNOSIS — Z11.3 SCREEN FOR STD (SEXUALLY TRANSMITTED DISEASE): ICD-10-CM

## 2019-07-30 LAB
ALBUMIN SERPL-MCNC: 3.6 G/DL (ref 3.4–5)
ALP SERPL-CCNC: 44 U/L (ref 40–150)
ALT SERPL W P-5'-P-CCNC: 20 U/L (ref 0–50)
ANION GAP SERPL CALCULATED.3IONS-SCNC: 7 MMOL/L (ref 3–14)
AST SERPL W P-5'-P-CCNC: 15 U/L (ref 0–45)
BILIRUB SERPL-MCNC: 0.3 MG/DL (ref 0.2–1.3)
BUN SERPL-MCNC: 18 MG/DL (ref 7–30)
CALCIUM SERPL-MCNC: 9 MG/DL (ref 8.5–10.1)
CHLORIDE SERPL-SCNC: 110 MMOL/L (ref 94–109)
CHOLEST SERPL-MCNC: 206 MG/DL
CO2 SERPL-SCNC: 25 MMOL/L (ref 20–32)
CREAT SERPL-MCNC: 0.76 MG/DL (ref 0.52–1.04)
GFR SERPL CREATININE-BSD FRML MDRD: >90 ML/MIN/{1.73_M2}
GLUCOSE SERPL-MCNC: 88 MG/DL (ref 70–99)
HDLC SERPL-MCNC: 48 MG/DL
LDLC SERPL CALC-MCNC: 133 MG/DL
NONHDLC SERPL-MCNC: 155 MG/DL
POTASSIUM SERPL-SCNC: 4.2 MMOL/L (ref 3.4–5.3)
PROT SERPL-MCNC: 7.5 G/DL (ref 6.8–8.8)
SODIUM SERPL-SCNC: 142 MMOL/L (ref 133–144)
TRIGL SERPL-MCNC: 112 MG/DL

## 2019-07-30 PROCEDURE — 90715 TDAP VACCINE 7 YRS/> IM: CPT | Performed by: OBSTETRICS & GYNECOLOGY

## 2019-07-30 PROCEDURE — 36415 COLL VENOUS BLD VENIPUNCTURE: CPT | Performed by: OBSTETRICS & GYNECOLOGY

## 2019-07-30 PROCEDURE — G0145 SCR C/V CYTO,THINLAYER,RESCR: HCPCS | Performed by: OBSTETRICS & GYNECOLOGY

## 2019-07-30 PROCEDURE — 87624 HPV HI-RISK TYP POOLED RSLT: CPT | Performed by: OBSTETRICS & GYNECOLOGY

## 2019-07-30 PROCEDURE — 80061 LIPID PANEL: CPT | Performed by: OBSTETRICS & GYNECOLOGY

## 2019-07-30 PROCEDURE — 80053 COMPREHEN METABOLIC PANEL: CPT | Performed by: OBSTETRICS & GYNECOLOGY

## 2019-07-30 PROCEDURE — 87591 N.GONORRHOEAE DNA AMP PROB: CPT | Performed by: OBSTETRICS & GYNECOLOGY

## 2019-07-30 PROCEDURE — 87491 CHLMYD TRACH DNA AMP PROBE: CPT | Performed by: OBSTETRICS & GYNECOLOGY

## 2019-07-30 PROCEDURE — 99395 PREV VISIT EST AGE 18-39: CPT | Mod: 25 | Performed by: OBSTETRICS & GYNECOLOGY

## 2019-07-30 PROCEDURE — 82306 VITAMIN D 25 HYDROXY: CPT | Performed by: OBSTETRICS & GYNECOLOGY

## 2019-07-30 PROCEDURE — 90471 IMMUNIZATION ADMIN: CPT | Performed by: OBSTETRICS & GYNECOLOGY

## 2019-07-30 RX ORDER — NORGESTIMATE AND ETHINYL ESTRADIOL 7DAYSX3 LO
1 KIT ORAL DAILY
Qty: 84 TABLET | Refills: 4 | Status: SHIPPED | OUTPATIENT
Start: 2019-07-30 | End: 2020-08-18

## 2019-07-30 ASSESSMENT — ANXIETY QUESTIONNAIRES
3. WORRYING TOO MUCH ABOUT DIFFERENT THINGS: SEVERAL DAYS
5. BEING SO RESTLESS THAT IT IS HARD TO SIT STILL: NOT AT ALL
IF YOU CHECKED OFF ANY PROBLEMS ON THIS QUESTIONNAIRE, HOW DIFFICULT HAVE THESE PROBLEMS MADE IT FOR YOU TO DO YOUR WORK, TAKE CARE OF THINGS AT HOME, OR GET ALONG WITH OTHER PEOPLE: NOT DIFFICULT AT ALL
2. NOT BEING ABLE TO STOP OR CONTROL WORRYING: NOT AT ALL
GAD7 TOTAL SCORE: 2
1. FEELING NERVOUS, ANXIOUS, OR ON EDGE: SEVERAL DAYS
7. FEELING AFRAID AS IF SOMETHING AWFUL MIGHT HAPPEN: NOT AT ALL
6. BECOMING EASILY ANNOYED OR IRRITABLE: NOT AT ALL

## 2019-07-30 ASSESSMENT — MIFFLIN-ST. JEOR: SCORE: 1143.17

## 2019-07-30 ASSESSMENT — PATIENT HEALTH QUESTIONNAIRE - PHQ9
SUM OF ALL RESPONSES TO PHQ QUESTIONS 1-9: 1
5. POOR APPETITE OR OVEREATING: NOT AT ALL

## 2019-07-31 LAB
C TRACH DNA SPEC QL NAA+PROBE: NEGATIVE
DEPRECATED CALCIDIOL+CALCIFEROL SERPL-MC: 57 UG/L (ref 20–75)
N GONORRHOEA DNA SPEC QL NAA+PROBE: NEGATIVE
SPECIMEN SOURCE: NORMAL
SPECIMEN SOURCE: NORMAL

## 2019-07-31 ASSESSMENT — ANXIETY QUESTIONNAIRES: GAD7 TOTAL SCORE: 2

## 2019-08-02 LAB
COPATH REPORT: NORMAL
PAP: NORMAL

## 2019-08-06 LAB
FINAL DIAGNOSIS: ABNORMAL
HPV HR 12 DNA CVX QL NAA+PROBE: POSITIVE
HPV16 DNA SPEC QL NAA+PROBE: NEGATIVE
HPV18 DNA SPEC QL NAA+PROBE: NEGATIVE
SPECIMEN DESCRIPTION: ABNORMAL
SPECIMEN SOURCE CVX/VAG CYTO: ABNORMAL

## 2019-08-06 NOTE — PROGRESS NOTES
8/13/15: Pap - ASCUS, + HR HPV. Plan colp.   10/28/15: [facility] - WNL. Plan cotest pap & HPV in 1 year  8/15/16:Pap--NIL, neg HPV. Per [facility], repeat Pap + HPV cotesting in 3 years (2019)  7/30/19 NIL Pap, + HR HPV (Neg 16/18). Plan colp  8/6/19 Pt notified. (vonnie)  8/26/19 Colpo-neg.  Plan: pap in 1 year (lks)

## 2019-08-07 ENCOUNTER — TELEPHONE (OUTPATIENT)
Dept: OBGYN | Facility: CLINIC | Age: 34
End: 2019-08-07

## 2019-08-07 NOTE — TELEPHONE ENCOUNTER
Pt calling to clarify directions given to her regarding her colpo. Reviewed the notes from Glenda PINTO Rn- Pap Tracking  No further questions

## 2019-08-15 ENCOUNTER — MYC MEDICAL ADVICE (OUTPATIENT)
Dept: OBGYN | Facility: CLINIC | Age: 34
End: 2019-08-15

## 2019-08-26 ENCOUNTER — OFFICE VISIT (OUTPATIENT)
Dept: OBGYN | Facility: CLINIC | Age: 34
End: 2019-08-26
Payer: COMMERCIAL

## 2019-08-26 VITALS — SYSTOLIC BLOOD PRESSURE: 120 MMHG | BODY MASS INDEX: 20.41 KG/M2 | DIASTOLIC BLOOD PRESSURE: 74 MMHG | WEIGHT: 108 LBS

## 2019-08-26 DIAGNOSIS — R87.810 CERVICAL HIGH RISK HPV (HUMAN PAPILLOMAVIRUS) TEST POSITIVE: Primary | ICD-10-CM

## 2019-08-26 DIAGNOSIS — Z01.812 PRE-PROCEDURE LAB EXAM: ICD-10-CM

## 2019-08-26 LAB — HCG UR QL: NEGATIVE

## 2019-08-26 PROCEDURE — 57454 BX/CURETT OF CERVIX W/SCOPE: CPT | Performed by: OBSTETRICS & GYNECOLOGY

## 2019-08-26 PROCEDURE — 81025 URINE PREGNANCY TEST: CPT | Performed by: OBSTETRICS & GYNECOLOGY

## 2019-08-26 PROCEDURE — 88305 TISSUE EXAM BY PATHOLOGIST: CPT | Performed by: OBSTETRICS & GYNECOLOGY

## 2019-08-26 NOTE — PROGRESS NOTES
INDICATIONS:                                                    Is a pregnancy test required: Yes.  Was it positive or negative?  Negative  Was a consent obtained?  Yes      Lluvia Silver, is a 33 year old female, who had a recent WNL pap.  HPV other (+)pos.  Yes prior history of abnormal pap. Here today for colposcopy. Discussed indication, risks of infection and bleeding.    Her last pap was   Lab Results   Component Value Date    PAP NIL 07/30/2019    .    PROCEDURE:                                                      Cervix is stained with acetic acid and viewed colposcopically. Squamocolumnar junction is visualized in it's entirety. acetowhite lesion(s) noted at 12 o'clock . Biopsy done Yes. Endocervical curretage Done         POST PROCEDURE:                                                      IMPRESSION: Patient tolerated procedure well, colposcopy adequate, HPV and Normal cervix    PLAN : Await the results of the biopsies.  Repeat pap in 12 months.  She  tolerated the procedure well. There were no complications. Patient was discharged in stable condition.    Patient advised to call the clinic if excessive bleeding, pelvic pain, or fever.     Follow-up plan based on pathology results.    Kayce Beatty MD

## 2019-08-28 LAB — COPATH REPORT: NORMAL

## 2019-09-18 ENCOUNTER — MYC MEDICAL ADVICE (OUTPATIENT)
Dept: OBGYN | Facility: CLINIC | Age: 34
End: 2019-09-18

## 2019-09-18 DIAGNOSIS — B36.0 TINEA VERSICOLOR: Primary | ICD-10-CM

## 2019-09-19 RX ORDER — KETOCONAZOLE 20 MG/ML
SHAMPOO TOPICAL DAILY PRN
Qty: 120 ML | Refills: 3 | Status: SHIPPED | OUTPATIENT
Start: 2019-09-19 | End: 2020-08-18

## 2020-03-02 ENCOUNTER — HEALTH MAINTENANCE LETTER (OUTPATIENT)
Age: 35
End: 2020-03-02

## 2020-08-17 NOTE — PROGRESS NOTES
Lluiva is a 34 year old  female who presents for annual exam.     Besides routine health maintenance, she has no other health concerns today .    HPI:  Doing well. Questions about a new extract she is taking. Single and ok with it. 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 Seeing clients remotely.      GYNECOLOGIC HISTORY:    Patient's last menstrual period was 2020 (exact date).    Regular menses? yes  Menses every 28 days.  Length of menses: 5 days    Her current contraception method is: oral contraceptives.  She  reports that she has never smoked. She has never used smokeless tobacco.    Patient is not sexually active.  STD testing offered?  Declined  Last PHQ-9 score on record =   PHQ-9 SCORE 2020   PHQ-9 Total Score 0     Last GAD7 score on record =   ILIR-7 SCORE 2020   Total Score 2     Alcohol Score = 2    HEALTH MAINTENANCE:    Cholesterol:   Recent Labs   Lab Test 19  0842 14  0729   CHOL 206* 188   HDL 48* 46*   * 105   TRIG 112 193*   CHOLHDLRATIO  --  4.1       Last Mammo: Not applicable, Result: Not applicable, Next Mammo: Due at age 42   Pap:   Lab Results   Component Value Date    PAP NIL HPV+ 2019    PAP NIL 08/15/2016    PAP ASC-US 2015     Colonoscopy:  NA, Result: Not applicable, Next Colonoscopy: age 52 years.  Dexa:  NA    Health maintenance updated:  yes    HISTORY:  OB History    Para Term  AB Living   0 0 0 0 0 0   SAB TAB Ectopic Multiple Live Births   0 0 0 0 0       Patient Active Problem List   Diagnosis     CARDIOVASCULAR SCREENING; LDL GOAL LESS THAN 160     Menometrorrhagia     Acne     Scoliosis     ASCUS with positive high risk HPV cervical     Past Surgical History:   Procedure Laterality Date     NO HISTORY OF SURGERY        Social History     Tobacco Use     Smoking status: Never Smoker     Smokeless tobacco: Never Used   Substance Use Topics     Alcohol use: Yes       "Problem (# of Occurrences) Relation (Name,Age of Onset)    Alzheimer Disease (1) Maternal Grandmother    Chronic Obstructive Pulmonary Disease (1) Maternal Grandfather    Heart Disease (2) Maternal Grandfather, Paternal Grandfather    Hyperlipidemia (1) Paternal Grandmother    Hypertension (1) Paternal Grandmother    Leukemia (1) Maternal Grandfather    No Known Problems (5) Mother, Father, Sister, Brother, Other            Current Outpatient Medications   Medication Sig     ketoconazole (NIZORAL) 2 % external shampoo Apply topically daily as needed for itching or irritation     norgestim-eth estrad triphasic (ORTHO TRI-CYCLEN LO) 0.18/0.215/0.25 MG-25 MCG tablet Take 1 tablet by mouth daily     No current facility-administered medications for this visit.      No Known Allergies    Past medical, surgical, social and family histories were reviewed and updated in EPIC.    ROS:   12 point review of systems negative other than symptoms noted below or in the HPI.  No urinary frequency or dysuria, bladder or kidney problems    EXAM:  /60   Pulse 72   Ht 1.562 m (5' 1.5\")   Wt 48.4 kg (106 lb 9.6 oz)   LMP 08/05/2020 (Exact Date)   Breastfeeding No   BMI 19.82 kg/m     BMI: Body mass index is 19.82 kg/m .    PHYSICAL EXAM:  Constitutional:   Appearance: Well nourished, well developed, alert, in no acute distress  Neck:  Lymph Nodes:  No lymphadenopathy present    Thyroid:  Gland size normal, nontender, no nodules or masses present on palpation  Chest:  Respiratory Effort:  Breathing unlabored, CTAB  Cardiovascular:    Heart: Auscultation:  Regular rate, normal rhythm, no murmurs present  Breasts: Inspection of Breasts:  No lymphadenopathy present., Palpation of Breasts and Axillae:  No masses present on palpation, no breast tenderness., Axillary Lymph Nodes:  No lymphadenopathy present. and No nodularity, asymmetry or nipple discharge bilaterally.  Gastrointestinal:   Abdominal Examination:  Abdomen nontender " to palpation, tone normal without rigidity or guarding, no masses present, umbilicus without lesions   Liver and Spleen:  No hepatomegaly present, liver nontender to palpation    Hernias:  No hernias present  Lymphatic: Lymph Nodes:  No other lymphadenopathy present  Skin:  General Inspection:  No rashes present, no lesions present, no areas of  discoloration  Neurologic:    Mental Status:  Oriented X3.  Normal strength and tone, sensory exam                grossly normal, mentation intact and speech normal.    Psychiatric:   Mentation appears normal and affect normal/bright.         Pelvic Exam:  External Genitalia:     Normal appearance for age, no discharge present, no tenderness present, no inflammatory lesions present, color normal  Vagina:     Normal vaginal vault without central or paravaginal defects, no discharge present, no inflammatory lesions present, no masses present  Bladder:     Nontender to palpation  Urethra:   Urethral Body:  Urethra palpation normal, urethra structural support normal   Urethral Meatus:  No erythema or lesions present  Cervix:     Appearance healthy, no lesions present, nontender to palpation, no bleeding present  Uterus:     Uterus: firm, normal sized and nontender, anteverted in position.   Adnexa:     No adnexal tenderness present, no adnexal masses present  Perineum:     Perineum within normal limits, no evidence of trauma, no rashes or skin lesions present  Genitalia and Groin:     No rashes present, no lesions present, no areas of discoloration, no masses present      COUNSELING:   Reviewed preventive health counseling, as reflected in patient instructions       Regular exercise    BMI: Body mass index is 19.82 kg/m .      ASSESSMENT:  34 year old female with satisfactory annual exam.    ICD-10-CM    1. Encounter for gynecological examination without abnormal finding  Z01.419        PLAN:  Pap performed today due to high risk HPV in 2019 with history of LULU 2 in the  past.  Will repeat screening lipids today as it was elevated last year  Encouraged continued exercise.  Ok to continue on OCP at this time    Kayce Beatty MD

## 2020-08-18 ENCOUNTER — OFFICE VISIT (OUTPATIENT)
Dept: OBGYN | Facility: CLINIC | Age: 35
End: 2020-08-18
Payer: COMMERCIAL

## 2020-08-18 VITALS
DIASTOLIC BLOOD PRESSURE: 60 MMHG | WEIGHT: 106.6 LBS | HEART RATE: 72 BPM | SYSTOLIC BLOOD PRESSURE: 124 MMHG | BODY MASS INDEX: 19.62 KG/M2 | HEIGHT: 62 IN

## 2020-08-18 DIAGNOSIS — Z30.011 ENCOUNTER FOR INITIAL PRESCRIPTION OF CONTRACEPTIVE PILLS: ICD-10-CM

## 2020-08-18 DIAGNOSIS — Z13.220 ENCOUNTER FOR LIPID SCREENING FOR CARDIOVASCULAR DISEASE: ICD-10-CM

## 2020-08-18 DIAGNOSIS — Z01.419 ENCOUNTER FOR GYNECOLOGICAL EXAMINATION WITHOUT ABNORMAL FINDING: Primary | ICD-10-CM

## 2020-08-18 DIAGNOSIS — Z13.6 ENCOUNTER FOR LIPID SCREENING FOR CARDIOVASCULAR DISEASE: ICD-10-CM

## 2020-08-18 DIAGNOSIS — Z13.228 SCREENING FOR METABOLIC DISORDER: ICD-10-CM

## 2020-08-18 DIAGNOSIS — B36.0 TINEA VERSICOLOR: ICD-10-CM

## 2020-08-18 LAB
ALBUMIN SERPL-MCNC: 3.6 G/DL (ref 3.4–5)
ALP SERPL-CCNC: 43 U/L (ref 40–150)
ALT SERPL W P-5'-P-CCNC: 21 U/L (ref 0–50)
ANION GAP SERPL CALCULATED.3IONS-SCNC: 7 MMOL/L (ref 3–14)
AST SERPL W P-5'-P-CCNC: 21 U/L (ref 0–45)
BILIRUB SERPL-MCNC: 0.4 MG/DL (ref 0.2–1.3)
BUN SERPL-MCNC: 13 MG/DL (ref 7–30)
CALCIUM SERPL-MCNC: 9.2 MG/DL (ref 8.5–10.1)
CHLORIDE SERPL-SCNC: 106 MMOL/L (ref 94–109)
CHOLEST SERPL-MCNC: 207 MG/DL
CO2 SERPL-SCNC: 26 MMOL/L (ref 20–32)
CREAT SERPL-MCNC: 0.81 MG/DL (ref 0.52–1.04)
GFR SERPL CREATININE-BSD FRML MDRD: >90 ML/MIN/{1.73_M2}
GLUCOSE SERPL-MCNC: 89 MG/DL (ref 70–99)
HDLC SERPL-MCNC: 42 MG/DL
LDLC SERPL CALC-MCNC: 128 MG/DL
NONHDLC SERPL-MCNC: 165 MG/DL
POTASSIUM SERPL-SCNC: 3.9 MMOL/L (ref 3.4–5.3)
PROT SERPL-MCNC: 7.5 G/DL (ref 6.8–8.8)
SODIUM SERPL-SCNC: 139 MMOL/L (ref 133–144)
TRIGL SERPL-MCNC: 183 MG/DL

## 2020-08-18 PROCEDURE — 80061 LIPID PANEL: CPT | Performed by: OBSTETRICS & GYNECOLOGY

## 2020-08-18 PROCEDURE — 88175 CYTOPATH C/V AUTO FLUID REDO: CPT | Performed by: OBSTETRICS & GYNECOLOGY

## 2020-08-18 PROCEDURE — 99395 PREV VISIT EST AGE 18-39: CPT | Performed by: OBSTETRICS & GYNECOLOGY

## 2020-08-18 PROCEDURE — 80053 COMPREHEN METABOLIC PANEL: CPT | Performed by: OBSTETRICS & GYNECOLOGY

## 2020-08-18 PROCEDURE — 87624 HPV HI-RISK TYP POOLED RSLT: CPT | Performed by: OBSTETRICS & GYNECOLOGY

## 2020-08-18 PROCEDURE — 36415 COLL VENOUS BLD VENIPUNCTURE: CPT | Performed by: OBSTETRICS & GYNECOLOGY

## 2020-08-18 RX ORDER — KETOCONAZOLE 20 MG/ML
SHAMPOO TOPICAL DAILY PRN
Qty: 120 ML | Refills: 3 | Status: SHIPPED | OUTPATIENT
Start: 2020-08-18 | End: 2022-12-06

## 2020-08-18 RX ORDER — NORGESTIMATE AND ETHINYL ESTRADIOL 7DAYSX3 LO
1 KIT ORAL DAILY
Qty: 84 TABLET | Refills: 4 | Status: SHIPPED | OUTPATIENT
Start: 2020-08-18 | End: 2021-08-30

## 2020-08-18 ASSESSMENT — ANXIETY QUESTIONNAIRES
6. BECOMING EASILY ANNOYED OR IRRITABLE: SEVERAL DAYS
IF YOU CHECKED OFF ANY PROBLEMS ON THIS QUESTIONNAIRE, HOW DIFFICULT HAVE THESE PROBLEMS MADE IT FOR YOU TO DO YOUR WORK, TAKE CARE OF THINGS AT HOME, OR GET ALONG WITH OTHER PEOPLE: NOT DIFFICULT AT ALL
3. WORRYING TOO MUCH ABOUT DIFFERENT THINGS: NOT AT ALL
5. BEING SO RESTLESS THAT IT IS HARD TO SIT STILL: NOT AT ALL
1. FEELING NERVOUS, ANXIOUS, OR ON EDGE: SEVERAL DAYS
GAD7 TOTAL SCORE: 2
7. FEELING AFRAID AS IF SOMETHING AWFUL MIGHT HAPPEN: NOT AT ALL
2. NOT BEING ABLE TO STOP OR CONTROL WORRYING: NOT AT ALL

## 2020-08-18 ASSESSMENT — MIFFLIN-ST. JEOR: SCORE: 1138.84

## 2020-08-18 ASSESSMENT — PATIENT HEALTH QUESTIONNAIRE - PHQ9
5. POOR APPETITE OR OVEREATING: NOT AT ALL
SUM OF ALL RESPONSES TO PHQ QUESTIONS 1-9: 0

## 2020-08-19 ASSESSMENT — ANXIETY QUESTIONNAIRES: GAD7 TOTAL SCORE: 2

## 2020-08-20 LAB
COPATH REPORT: NORMAL
PAP: NORMAL

## 2020-08-24 ENCOUNTER — PATIENT OUTREACH (OUTPATIENT)
Dept: OBGYN | Facility: CLINIC | Age: 35
End: 2020-08-24

## 2020-08-24 NOTE — TELEPHONE ENCOUNTER
8/13/15: Pap - ASCUS, + HR HPV. Plan colp.   10/28/15: [place] - WNL. Plan cotest pap & HPV in 1 year  8/15/16:Pap--NIL, neg HPV. Per [facility], repeat Pap + HPV cotesting in 3 years (2019)  7/30/19 NIL Pap, + HR HPV (Neg 16/18). Plan colp  8/26/19 Colpo-neg.  Plan: pap in 1 year  8/18/20 NIL, +HR HPV, not 16/18. Plan [facility] bef 11/18/20

## 2020-09-22 ENCOUNTER — OFFICE VISIT (OUTPATIENT)
Dept: OBGYN | Facility: CLINIC | Age: 35
End: 2020-09-22
Payer: COMMERCIAL

## 2020-09-22 VITALS — DIASTOLIC BLOOD PRESSURE: 68 MMHG | SYSTOLIC BLOOD PRESSURE: 102 MMHG

## 2020-09-22 DIAGNOSIS — Z01.812 PRE-PROCEDURE LAB EXAM: ICD-10-CM

## 2020-09-22 DIAGNOSIS — R87.810 CERVICAL HIGH RISK HPV (HUMAN PAPILLOMAVIRUS) TEST POSITIVE: Primary | ICD-10-CM

## 2020-09-22 LAB — HCG UR QL: NEGATIVE

## 2020-09-22 PROCEDURE — 81025 URINE PREGNANCY TEST: CPT | Performed by: OBSTETRICS & GYNECOLOGY

## 2020-09-22 PROCEDURE — 57454 BX/CURETT OF CERVIX W/SCOPE: CPT | Performed by: OBSTETRICS & GYNECOLOGY

## 2020-09-22 PROCEDURE — 88305 TISSUE EXAM BY PATHOLOGIST: CPT | Performed by: OBSTETRICS & GYNECOLOGY

## 2020-09-22 NOTE — PROGRESS NOTES
INDICATIONS:                                                    Is a pregnancy test required: Yes.  Was it positive or negative?  Negative  Was a consent obtained?  Yes    Today's PHQ-2 Score:   PHQ-2 ( 1999 [employer]) 7/30/2019   Q1: Little interest or pleasure in doing things 0   Q2: Feeling down, depressed or hopeless 0   PHQ-2 Score 0     Today's PHQ-9 Score:    PHQ-9 SCORE 8/18/2020   PHQ-9 Total Score 0       Lluvia Silver, is a 34 year old female, who had a recent WNIL pap.  HPV Other positive Yes prior history of abnormal pap. Here today for colposcopy. Discussed indication, risks of infection and bleeding.    Her last pap was     08/18/20 NIL, +HR HPV, not 16/18   Recommendation for [facility] D/T Hx of colp in 2019    PROCEDURE:                                                      Cervix is stained with acetic acid and viewed colposcopically. Squamocolumnar junction is visualized in it's entirety. acetowhite lesion(s) noted at 6 o'clock . Biopsy done Yes. Endocervical curretage Done         POST PROCEDURE:                                                      IMPRESSION: Patient tolerated procedure well, colposcopy adequate, HPV and Normal cervix    PLAN : Await the results of the biopsies.  She tolerated the procedure well. There were no complications. Patient was discharged in stable condition.    Patient advised to call the clinic if excessive bleeding, pelvic pain, or fever.     Follow-up based on pathology results.  Kayce Beatty MD

## 2020-09-24 ENCOUNTER — PATIENT OUTREACH (OUTPATIENT)
Dept: OBGYN | Facility: CLINIC | Age: 35
End: 2020-09-24

## 2020-09-24 LAB — COPATH REPORT: NORMAL

## 2020-09-24 NOTE — TELEPHONE ENCOUNTER
8/13/15: Pap - ASCUS, + HR HPV. Plan colp.   10/28/15: [place] - WNL. Plan cotest pap & HPV in 1 year  8/15/16:Pap--NIL, neg HPV. Per [facility], repeat Pap + HPV cotesting in 3 years (2019)  7/30/19 NIL Pap, + HR HPV (Neg 16/18). Plan colp  8/26/19 Colpo-neg.  Plan: pap in 1 year  8/18/20 NIL, +HR HPV, not 16/18. Plan [facility] bef 11/18/20 8/25/20 Pt informed  9/22/20 [facility]- Cervical Bx and ECC- Negative. Plan 1 yr co-test  9/24/20 Msg left by provider with results

## 2020-12-14 ENCOUNTER — HEALTH MAINTENANCE LETTER (OUTPATIENT)
Age: 35
End: 2020-12-14

## 2021-01-19 ENCOUNTER — OFFICE VISIT (OUTPATIENT)
Dept: OBGYN | Facility: CLINIC | Age: 36
End: 2021-01-19
Payer: COMMERCIAL

## 2021-01-19 VITALS
HEIGHT: 61 IN | WEIGHT: 112.4 LBS | DIASTOLIC BLOOD PRESSURE: 62 MMHG | SYSTOLIC BLOOD PRESSURE: 110 MMHG | BODY MASS INDEX: 21.22 KG/M2

## 2021-01-19 DIAGNOSIS — K64.5 THROMBOSED EXTERNAL HEMORRHOIDS: Primary | ICD-10-CM

## 2021-01-19 DIAGNOSIS — Z11.3 SCREEN FOR STD (SEXUALLY TRANSMITTED DISEASE): ICD-10-CM

## 2021-01-19 DIAGNOSIS — Z11.8 SCREENING FOR CHLAMYDIAL DISEASE: ICD-10-CM

## 2021-01-19 PROCEDURE — 99213 OFFICE O/P EST LOW 20 MIN: CPT | Performed by: OBSTETRICS & GYNECOLOGY

## 2021-01-19 PROCEDURE — 87591 N.GONORRHOEAE DNA AMP PROB: CPT | Performed by: OBSTETRICS & GYNECOLOGY

## 2021-01-19 PROCEDURE — 87491 CHLMYD TRACH DNA AMP PROBE: CPT | Performed by: OBSTETRICS & GYNECOLOGY

## 2021-01-19 ASSESSMENT — MIFFLIN-ST. JEOR: SCORE: 1152.22

## 2021-01-19 NOTE — PATIENT INSTRUCTIONS
Use docusate sodium 100mg twice a day this is a stool softener    Use milk of magnesia every other night: 30ml    Use preparation H cream or ointment on the anus, apply twice a day.    Witch Hazel can be applied to the anus as many times as needed for soothing. Can also use pads or gauzed soaked in witch hazel and frozen as compresses.

## 2021-01-19 NOTE — PROGRESS NOTES
SUBJECTIVE:                                                   Lluvia Silver is a 35 year old female who presents to clinic today for the following health issue(s):  Patient presents with:  Gyn Exam: lump in around anal area noticed this morning      Additional information:     HPI:  Lluvia presents with a painful lump in her anus. She states that she does have significant constipation and has bowel movements about 2-3 times a week. She has never had hemorrhoids to her knowledge before. Even though the lump is painful it is not significantly painful and was more concerned about ruling out an STI.    No LMP recorded. (Menstrual status: Birth Control)..     Patient is sexually active, .  Using oral contraceptives for contraception.    reports that she has never smoked. She has never used smokeless tobacco.    STD testing offered?  Accepted    Health maintenance updated:  no    Today's PHQ-2 Score:   PHQ-2 (  [employer]) 2019   Q1: Little interest or pleasure in doing things 0   Q2: Feeling down, depressed or hopeless 0   PHQ-2 Score 0     Today's PHQ-9 Score:   PHQ-9 SCORE 2020   PHQ-9 Total Score 0     Today's ILIR-7 Score:   ILIR-7 SCORE 2020   Total Score 2       Problem list and histories reviewed & adjusted, as indicated.  Additional history: as documented.    Patient Active Problem List   Diagnosis     CARDIOVASCULAR SCREENING; LDL GOAL LESS THAN 160     Menometrorrhagia     Acne     Scoliosis     ASCUS with positive high risk HPV cervical     Past Surgical History:   Procedure Laterality Date     NO HISTORY OF SURGERY        Social History     Tobacco Use     Smoking status: Never Smoker     Smokeless tobacco: Never Used   Substance Use Topics     Alcohol use: Yes      Problem (# of Occurrences) Relation (Name,Age of Onset)    Alzheimer Disease (1) Maternal Grandmother    Chronic Obstructive Pulmonary Disease (1) Maternal Grandfather    Heart Disease (2) Maternal Grandfather, Paternal  "Grandfather    Hyperlipidemia (1) Paternal Grandmother    Hypertension (1) Paternal Grandmother    Leukemia (1) Maternal Grandfather    No Known Problems (5) Mother, Father, Sister, Brother, Other            Current Outpatient Medications   Medication Sig     ketoconazole (NIZORAL) 2 % external shampoo Apply topically daily as needed for itching or irritation     norgestim-eth estrad triphasic (ORTHO TRI-CYCLEN LO) 0.18/0.215/0.25 MG-25 MCG tablet Take 1 tablet by mouth daily     No current facility-administered medications for this visit.      No Known Allergies    ROS:  12 point review of systems negative other than symptoms noted below or in the HPI.  No urinary frequency or dysuria, bladder or kidney problems      OBJECTIVE:     /62   Ht 1.549 m (5' 1\")   Wt 51 kg (112 lb 6.4 oz)   Breastfeeding No   BMI 21.24 kg/m    Body mass index is 21.24 kg/m .    Exam:  Constitutional:  Appearance: Well nourished, well developed alert, in no acute distress  Skin: General Inspection:  No rashes present, no lesions present, no areas of discoloration.  Neurologic:  Mental Status:  Oriented X3.  Normal strength and tone, sensory exam grossly normal, mentation intact and speech normal.    Psychiatric:  Mentation appears normal and affect normal/bright.  Pelvic Exam:  External Genitalia:     Normal appearance for age, no discharge present, no tenderness present, no inflammatory lesions present, color normal  Vagina:     Normal vaginal vault without central or paravaginal defects, no discharge present, no inflammatory lesions present, no masses present  Bladder:     Nontender to palpation  Urethra:   Urethral Body:  Urethra palpation normal, urethra structural support normal   Urethral Meatus:  No erythema or lesions present  Cervix:     Appearance healthy, no lesions present, nontender to palpation, no bleeding present    Perineum:     Perineum within normal limits, no evidence of trauma, no rashes or skin lesions " present  Anus:     Anus with a thrombosed external hemorrhoid seen at the 6 0'clock position. Non-tender to palpation    Genitalia and Groin:     No rashes present, no lesions present, no areas of discoloration, no masses present         ASSESSMENT/PLAN:                                                        ICD-10-CM    1. Thrombosed external hemorrhoids  K64.5    2. Screening for chlamydial disease  Z11.8 CHLAMYDIA TRACHOMATIS PCR   3. Screen for STD (sexually transmitted disease)  Z11.3 NEISSERIA GONORRHOEA PCR     An intensive bowel regimen was recommended as detailed below. If the hemorrhoid becomes more symptomatic then I recommend seeing colorectal surgery.  GC/CT screening was performed today.    Patient Instructions   Use docusate sodium 100mg twice a day this is a stool softener    Use milk of magnesia every other night: 30ml    Use preparation H cream or ointment on the anus, apply twice a day.    Witch Hazel can be applied to the anus as many times as needed for soothing. Can also use pads or gauzed soaked in witch hazel and frozen as compresses.        Kayce Beatty MD  [facility] [place]

## 2021-01-20 LAB
C TRACH DNA SPEC QL NAA+PROBE: NEGATIVE
N GONORRHOEA DNA SPEC QL NAA+PROBE: NEGATIVE
SPECIMEN SOURCE: NORMAL
SPECIMEN SOURCE: NORMAL

## 2021-08-05 NOTE — PROGRESS NOTES
Lluvia is a 35 year old  female who presents for annual exam.     Besides routine health maintenance, she has no other health concerns today .    HPI:  Doing well overall. 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 She is in a positive and supportive relationship since November of last year, he has three boys. She states that she is not back to her full exercise level yet. She is taking a multivitamin and a vitamin pack she gets from her sister.       GYNECOLOGIC HISTORY:    Patient's last menstrual period was 2021 (exact date).    Regular menses? yes  Menses every 28 days.  Length of menses: 4 days    Her current contraception method is: oral contraceptives.  She  reports that she has never smoked. She has never used smokeless tobacco.    Patient is sexually active.  STD testing offered?  Declined, had done in January  Last PHQ-9 score on record =   PHQ-9 SCORE 2021   PHQ-9 Total Score 0     Last GAD7 score on record =   ILIR-7 SCORE 2021   Total Score 0     Alcohol Score = 2    HEALTH MAINTENANCE:  Cholesterol:    Recent Labs   Lab Test 20  0859 19  0842 14  0729   CHOL 207* 206* 188   HDL 42* 48* 46*   * 133* 105   TRIG 183* 112 193*   CHOLHDLRATIO  --   --  4.1     Last Mammo: Not applicable, Result: Not applicable, Next Mammo: Due at age 42   Pap:   Lab Results   Component Value Date    PAP NIL, HPV+ 2020    PAP NIL, HPV+ 2019    PAP NIL, hpv- 08/15/2016     Colonoscopy:  NA, Result: Not applicable, Next Colonoscopy: Due age 47   Dexa:  NA    Health maintenance updated: follow up with IM for labs? yes    HISTORY:  OB History    Para Term  AB Living   0 0 0 0 0 0   SAB TAB Ectopic Multiple Live Births   0 0 0 0 0       Patient Active Problem List   Diagnosis     CARDIOVASCULAR SCREENING; LDL GOAL LESS THAN 160     Menometrorrhagia     Acne     Scoliosis     ASCUS with positive high risk HPV cervical     Past Surgical  "History:   Procedure Laterality Date     NO HISTORY OF SURGERY        Social History     Tobacco Use     Smoking status: Never Smoker     Smokeless tobacco: Never Used   Substance Use Topics     Alcohol use: Yes      Problem (# of Occurrences) Relation (Name,Age of Onset)    Alzheimer Disease (1) Maternal Grandmother    Chronic Obstructive Pulmonary Disease (1) Maternal Grandfather    Heart Disease (2) Maternal Grandfather, Paternal Grandfather    Hyperlipidemia (1) Paternal Grandmother    Hypertension (1) Paternal Grandmother    Leukemia (1) Maternal Grandfather    No Known Problems (5) Mother, Father, Sister, Brother, Other            Current Outpatient Medications   Medication Sig     ketoconazole (NIZORAL) 2 % external shampoo Apply topically daily as needed for itching or irritation     norgestim-eth estrad triphasic (ORTHO TRI-CYCLEN LO) 0.18/0.215/0.25 MG-25 MCG tablet Take 1 tablet by mouth daily     No current facility-administered medications for this visit.     No Known Allergies    Past medical, surgical, social and family histories were reviewed and updated in EPIC.    ROS:   12 point review of systems negative other than symptoms noted below or in the HPI.  No urinary frequency or dysuria, bladder or kidney problems    EXAM:  /64   Pulse 68   Ht 1.556 m (5' 1.25\")   Wt 50.5 kg (111 lb 6.4 oz)   LMP 08/04/2021 (Exact Date)   BMI 20.88 kg/m     BMI: Body mass index is 20.88 kg/m .    PHYSICAL EXAM:  Constitutional:   Appearance: Well nourished, well developed, alert, in no acute distress  Neck:  Lymph Nodes:  No lymphadenopathy present    Thyroid:  Gland size normal, nontender, no nodules or masses present on palpation  Chest:  Respiratory Effort:  Breathing unlabored, CTAB  Cardiovascular:    Heart: Auscultation:  Regular rate, normal rhythm, no murmurs present  Breasts: Inspection of Breasts:  No lymphadenopathy present., Palpation of Breasts and Axillae:  No masses present on palpation, no " breast tenderness., Axillary Lymph Nodes:  No lymphadenopathy present. and No nodularity, asymmetry or nipple discharge bilaterally.  Gastrointestinal:   Abdominal Examination:  Abdomen nontender to palpation, tone normal without rigidity or guarding, no masses present, umbilicus without lesions   Liver and Spleen:  No hepatomegaly present, liver nontender to palpation    Hernias:  No hernias present  Lymphatic: Lymph Nodes:  No other lymphadenopathy present  Skin:  General Inspection:  No rashes present, no lesions present, no areas of  discoloration  Neurologic:    Mental Status:  Oriented X3.  Normal strength and tone, sensory exam grossly normal, mentation intact and speech normal.    Psychiatric:   Mentation appears normal and affect normal/bright.         Pelvic Exam:  External Genitalia:     Normal appearance for age, no discharge present, no tenderness present, no inflammatory lesions present, color normal  Vagina:     Normal vaginal vault without central or paravaginal defects, no discharge present, no inflammatory lesions present, no masses present  Bladder:     Nontender to palpation  Urethra:   Urethral Body:  Urethra palpation normal, urethra structural support normal   Urethral Meatus:  No erythema or lesions present  Cervix:     Appearance healthy, no lesions present, nontender to palpation, no bleeding present  Uterus:     Uterus: firm, normal sized and nontender, anteverted in position.   Adnexa:     No adnexal tenderness present, no adnexal masses present  Perineum:     Perineum within normal limits, no evidence of trauma, no rashes or skin lesions present  Anus:     Anus within normal limits, no hemorrhoids present  Inguinal Lymph Nodes:     No lymphadenopathy present  Pubic Hair:     Normal pubic hair distribution for age  Genitalia and Groin:     No rashes present, no lesions present, no areas of discoloration, no masses present      COUNSELING:   Reviewed preventive health counseling, as  reflected in patient instructions    BMI: Body mass index is 20.88 kg/m .      ASSESSMENT:  35 year old female with satisfactory annual exam.    ICD-10-CM    1. Encounter for gynecological examination without abnormal finding  Z01.419    2. Cervical high risk HPV (human papillomavirus) test positive  R87.810 Pap thin layer screen with HPV - recommended age 30 - 65 years   3. Encounter for initial prescription of contraceptive pills  Z30.011 norgestim-eth estrad triphasic (ORTHO TRI-CYCLEN LO) 0.18/0.215/0.25 MG-25 MCG tablet   4. Encounter for lipid screening for cardiovascular disease  Z13.220 Lipid panel reflex to direct LDL Fasting    Z13.6 Lipid panel reflex to direct LDL Fasting   5. Screening for metabolic disorder  Z13.228 Comprehensive metabolic panel     Comprehensive metabolic panel   6. Screening for thyroid disorder  Z13.29 TSH with free T4 reflex     TSH with free T4 reflex   7. Encounter for vitamin deficiency screening  Z13.21 Vitamin D Deficiency     Vitamin D Deficiency       PLAN:  Pap smear done today. Recommended the HPV vaccine.  Recommend 150 minutes of cardio weekly  Recommend CoQ10 100 mg daily with a daily DHA or fish oil supplement  Screening labs today. Will add on TSH due to hyperlipidemia.    Kayce Beatty MD

## 2021-08-30 ENCOUNTER — OFFICE VISIT (OUTPATIENT)
Dept: OBGYN | Facility: CLINIC | Age: 36
End: 2021-08-30
Payer: COMMERCIAL

## 2021-08-30 VITALS
DIASTOLIC BLOOD PRESSURE: 64 MMHG | BODY MASS INDEX: 21.03 KG/M2 | HEART RATE: 68 BPM | HEIGHT: 61 IN | SYSTOLIC BLOOD PRESSURE: 102 MMHG | WEIGHT: 111.4 LBS

## 2021-08-30 DIAGNOSIS — R87.810 CERVICAL HIGH RISK HPV (HUMAN PAPILLOMAVIRUS) TEST POSITIVE: ICD-10-CM

## 2021-08-30 DIAGNOSIS — Z30.011 ENCOUNTER FOR INITIAL PRESCRIPTION OF CONTRACEPTIVE PILLS: ICD-10-CM

## 2021-08-30 DIAGNOSIS — Z01.419 ENCOUNTER FOR GYNECOLOGICAL EXAMINATION WITHOUT ABNORMAL FINDING: Primary | ICD-10-CM

## 2021-08-30 DIAGNOSIS — Z13.220 ENCOUNTER FOR LIPID SCREENING FOR CARDIOVASCULAR DISEASE: ICD-10-CM

## 2021-08-30 DIAGNOSIS — Z13.6 ENCOUNTER FOR LIPID SCREENING FOR CARDIOVASCULAR DISEASE: ICD-10-CM

## 2021-08-30 DIAGNOSIS — Z13.21 ENCOUNTER FOR VITAMIN DEFICIENCY SCREENING: ICD-10-CM

## 2021-08-30 DIAGNOSIS — Z13.29 SCREENING FOR THYROID DISORDER: ICD-10-CM

## 2021-08-30 DIAGNOSIS — Z13.228 SCREENING FOR METABOLIC DISORDER: ICD-10-CM

## 2021-08-30 LAB — DEPRECATED CALCIDIOL+CALCIFEROL SERPL-MC: 57 UG/L (ref 20–75)

## 2021-08-30 PROCEDURE — G0145 SCR C/V CYTO,THINLAYER,RESCR: HCPCS | Performed by: OBSTETRICS & GYNECOLOGY

## 2021-08-30 PROCEDURE — 84443 ASSAY THYROID STIM HORMONE: CPT | Performed by: OBSTETRICS & GYNECOLOGY

## 2021-08-30 PROCEDURE — 99395 PREV VISIT EST AGE 18-39: CPT | Performed by: OBSTETRICS & GYNECOLOGY

## 2021-08-30 PROCEDURE — 36415 COLL VENOUS BLD VENIPUNCTURE: CPT | Performed by: OBSTETRICS & GYNECOLOGY

## 2021-08-30 PROCEDURE — 80053 COMPREHEN METABOLIC PANEL: CPT | Performed by: OBSTETRICS & GYNECOLOGY

## 2021-08-30 PROCEDURE — 80061 LIPID PANEL: CPT | Performed by: OBSTETRICS & GYNECOLOGY

## 2021-08-30 PROCEDURE — 87624 HPV HI-RISK TYP POOLED RSLT: CPT | Performed by: OBSTETRICS & GYNECOLOGY

## 2021-08-30 PROCEDURE — 82306 VITAMIN D 25 HYDROXY: CPT | Performed by: OBSTETRICS & GYNECOLOGY

## 2021-08-30 RX ORDER — NORGESTIMATE AND ETHINYL ESTRADIOL 7DAYSX3 LO
1 KIT ORAL DAILY
Qty: 84 TABLET | Refills: 4 | Status: SHIPPED | OUTPATIENT
Start: 2021-08-30 | End: 2022-11-21

## 2021-08-30 ASSESSMENT — PATIENT HEALTH QUESTIONNAIRE - PHQ9
SUM OF ALL RESPONSES TO PHQ QUESTIONS 1-9: 0
5. POOR APPETITE OR OVEREATING: NOT AT ALL

## 2021-08-30 ASSESSMENT — ANXIETY QUESTIONNAIRES
5. BEING SO RESTLESS THAT IT IS HARD TO SIT STILL: NOT AT ALL
1. FEELING NERVOUS, ANXIOUS, OR ON EDGE: NOT AT ALL
2. NOT BEING ABLE TO STOP OR CONTROL WORRYING: NOT AT ALL
3. WORRYING TOO MUCH ABOUT DIFFERENT THINGS: NOT AT ALL
6. BECOMING EASILY ANNOYED OR IRRITABLE: NOT AT ALL
IF YOU CHECKED OFF ANY PROBLEMS ON THIS QUESTIONNAIRE, HOW DIFFICULT HAVE THESE PROBLEMS MADE IT FOR YOU TO DO YOUR WORK, TAKE CARE OF THINGS AT HOME, OR GET ALONG WITH OTHER PEOPLE: NOT DIFFICULT AT ALL
GAD7 TOTAL SCORE: 0
7. FEELING AFRAID AS IF SOMETHING AWFUL MIGHT HAPPEN: NOT AT ALL

## 2021-08-30 ASSESSMENT — MIFFLIN-ST. JEOR: SCORE: 1151.65

## 2021-08-31 LAB
ALBUMIN SERPL-MCNC: 3.6 G/DL (ref 3.4–5)
ALP SERPL-CCNC: 39 U/L (ref 40–150)
ALT SERPL W P-5'-P-CCNC: 20 U/L (ref 0–50)
ANION GAP SERPL CALCULATED.3IONS-SCNC: 7 MMOL/L (ref 3–14)
AST SERPL W P-5'-P-CCNC: 16 U/L (ref 0–45)
BILIRUB SERPL-MCNC: 0.4 MG/DL (ref 0.2–1.3)
BUN SERPL-MCNC: 12 MG/DL (ref 7–30)
CALCIUM SERPL-MCNC: 9 MG/DL (ref 8.5–10.1)
CHLORIDE BLD-SCNC: 105 MMOL/L (ref 94–109)
CHOLEST SERPL-MCNC: 239 MG/DL
CO2 SERPL-SCNC: 25 MMOL/L (ref 20–32)
CREAT SERPL-MCNC: 0.87 MG/DL (ref 0.52–1.04)
FASTING STATUS PATIENT QL REPORTED: YES
GFR SERPL CREATININE-BSD FRML MDRD: 88 ML/MIN/1.73M2
GLUCOSE BLD-MCNC: 73 MG/DL (ref 70–99)
HDLC SERPL-MCNC: 50 MG/DL
LDLC SERPL CALC-MCNC: 158 MG/DL
NONHDLC SERPL-MCNC: 189 MG/DL
POTASSIUM BLD-SCNC: 3.7 MMOL/L (ref 3.4–5.3)
PROT SERPL-MCNC: 7.5 G/DL (ref 6.8–8.8)
SODIUM SERPL-SCNC: 137 MMOL/L (ref 133–144)
TRIGL SERPL-MCNC: 153 MG/DL
TSH SERPL DL<=0.005 MIU/L-ACNC: 1.41 MU/L (ref 0.4–4)

## 2021-08-31 ASSESSMENT — ANXIETY QUESTIONNAIRES: GAD7 TOTAL SCORE: 0

## 2021-09-01 LAB
BKR LAB AP GYN ADEQUACY: NORMAL
BKR LAB AP GYN INTERPRETATION: NORMAL
BKR LAB AP HPV REFLEX: NORMAL
BKR LAB AP LMP: NORMAL
BKR LAB AP PREVIOUS ABNL DX: NORMAL
BKR LAB AP PREVIOUS ABNORMAL: NORMAL
PATH REPORT.COMMENTS IMP SPEC: NORMAL
PATH REPORT.RELEVANT HX SPEC: NORMAL

## 2021-09-03 LAB
HUMAN PAPILLOMA VIRUS 16 DNA: NEGATIVE
HUMAN PAPILLOMA VIRUS 18 DNA: NEGATIVE
HUMAN PAPILLOMA VIRUS FINAL DIAGNOSIS: NORMAL
HUMAN PAPILLOMA VIRUS OTHER HR: NEGATIVE

## 2021-10-02 ENCOUNTER — HEALTH MAINTENANCE LETTER (OUTPATIENT)
Age: 36
End: 2021-10-02

## 2022-09-03 ENCOUNTER — HEALTH MAINTENANCE LETTER (OUTPATIENT)
Age: 37
End: 2022-09-03

## 2022-11-21 DIAGNOSIS — Z30.011 ENCOUNTER FOR INITIAL PRESCRIPTION OF CONTRACEPTIVE PILLS: ICD-10-CM

## 2022-11-21 RX ORDER — NORGESTIMATE AND ETHINYL ESTRADIOL 7DAYSX3 LO
1 KIT ORAL DAILY
Qty: 28 TABLET | Refills: 0 | Status: CANCELLED | OUTPATIENT
Start: 2022-11-21

## 2022-11-21 RX ORDER — NORGESTIMATE AND ETHINYL ESTRADIOL 7DAYSX3 LO
1 KIT ORAL DAILY
Qty: 28 TABLET | Refills: 0 | Status: SHIPPED | OUTPATIENT
Start: 2022-11-21 | End: 2022-12-06

## 2022-11-21 NOTE — TELEPHONE ENCOUNTER
"Requested Prescriptions   Pending Prescriptions Disp Refills     norgestim-eth estrad triphasic (ORTHO TRI-CYCLEN LO) 0.18/0.215/0.25 MG-25 MCG tablet 84 tablet 4     Sig: Take 1 tablet by mouth daily       Contraceptives Protocol Passed - 11/21/2022  2:38 PM        Passed - Patient is not a current smoker if age is 35 or older        Passed - Recent (12 mo) or future (30 days) visit within the authorizing provider's specialty     Patient has had an office visit with the authorizing provider or a provider within the authorizing providers department within the previous 12 mos or has a future within next 30 days. See \"Patient Info\" tab in inbasket, or \"Choose Columns\" in Meds & Orders section of the refill encounter.              Passed - Medication is active on med list        Passed - No active pregnancy on record        Passed - No positive pregnancy test in past 12 months             Last Written Prescription Date:  8/30/2021  Last Fill Quantity: 84,  # refills: 4   Last office visit: 8/30/2021 with prescribing provider:  Dr. Beatty   Future Office Visit:   Next 5 appointments (look out 90 days)    Dec 06, 2022  8:50 AM  PHYSICAL with Kayce Beatty MD  [facility] [place] ([facility] ) [address] [place] [id number] [phone]         Prescription approved per [facility] Refill Protocol.  Anna Nevarez RN on 11/21/2022 at 2:40 PM      "

## 2022-12-05 NOTE — PROGRESS NOTES
Lluvia is a 37 year old  female who presents for annual exam.     Besides routine health maintenance, she has no other health concerns today .    HPI:  The patient's PCP is  Physician No Ref-Primary.  Lluvia presents for an annual exam. 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 She is partnered and doing well. Last paps were wnl therefore she is now Q3 years. Tolerating OCPs for menses regulation as her current partner has had a vasectomy. Lluvia now has a PCP and will be seeing Dr. Perez in 2023.      GYNECOLOGIC HISTORY:    Patient's last menstrual period was 2022 (exact date).    Regular menses? yes  Menses every 28 days.  Length of menses: 4 days    Her current contraception method is: oral contraceptives.  She  reports that she has never smoked. She has never used smokeless tobacco.    Patient is sexually active.  STD testing offered?  Accepted     Last PHQ-9 score on record =   PHQ-9 SCORE 2022   PHQ-9 Total Score 0     Last GAD7 score on record =   ILIR-7 SCORE 2022   Total Score 0     Alcohol Score = 2    HEALTH MAINTENANCE:    Cholesterol:   Recent Labs   Lab Test 21  0939 20  0859   CHOL 239* 207*   HDL 50 42*   * 128*   TRIG 153* 183*      Mammo: Not applicable, Result: Not applicable, Next Mammo: Due at age 42     Pap:   Lab Results   Component Value Date    PAP (+q1) NILM; neg HPV  2021    PAP NIL; +HRHPV (not 16 or 18)  2020    PAP NIL; +HRHPV (not 16 or 18 2019    PAP NIL; neg HPV  08/15/2016     Colonoscopy: NA, Result: Not applicable, Next Colonoscopy: Due at age 47.  Dexa:  NA, due at age 67    Health maintenance updated:  yes    HISTORY:  OB History    Para Term  AB Living   0 0 0 0 0 0   SAB IAB Ectopic Multiple Live Births   0 0 0 0 0       Patient Active Problem List   Diagnosis     CARDIOVASCULAR SCREENING; LDL GOAL LESS THAN  "160     Menometrorrhagia     Acne     Scoliosis     ASCUS with positive high risk HPV cervical     Past Surgical History:   Procedure Laterality Date     NO HISTORY OF SURGERY        Social History     Tobacco Use     Smoking status: Never     Smokeless tobacco: Never   Substance Use Topics     Alcohol use: Yes      Problem (# of Occurrences) Relation (Name,Age of Onset)    Heart Failure (1) Maternal Grandfather    Alzheimer Disease (1) Maternal Grandmother    Heart Disease (2) Maternal Grandfather, Paternal Grandfather    Hypertension (1) Paternal Grandmother    Hyperlipidemia (1) Paternal Grandmother    Chronic Obstructive Pulmonary Disease (1) Maternal Grandfather    Leukemia (1) Maternal Grandfather    No Known Problems (5) Mother, Father, Sister, Brother, Other            Current Outpatient Medications   Medication Sig     norgestim-eth estrad triphasic (ORTHO TRI-CYCLEN LO) 0.18/0.215/0.25 MG-25 MCG tablet Take 1 tablet by mouth daily     No current facility-administered medications for this visit.     No Known Allergies    Past medical, surgical, social and family histories were reviewed and updated in EPIC.    ROS:   12 point review of systems negative other than symptoms noted below or in the HPI.  No urinary frequency or dysuria, bladder or kidney problems    EXAM:  /56   Pulse 82   Ht 1.556 m (5' 1.25\")   Wt 53.5 kg (118 lb)   LMP 11/23/2022 (Exact Date)   Breastfeeding No   BMI 22.11 kg/m     BMI: Body mass index is 22.11 kg/m .    PHYSICAL EXAM:  Constitutional:   Appearance: Well nourished, well developed, alert, in no acute distress  Neck:  Lymph Nodes:  No lymphadenopathy present    Thyroid:  Gland size normal, nontender, no nodules or masses present on palpation  Chest:  Respiratory Effort:  Breathing unlabored, CTAB  Cardiovascular:    Heart: Auscultation:  Regular rate, normal rhythm, no murmurs present  Breasts: Inspection of Breasts:  No lymphadenopathy present., Palpation of Breasts " and Axillae:  No masses present on palpation, no breast tenderness., Axillary Lymph Nodes:  No lymphadenopathy present. and No nodularity, asymmetry or nipple discharge bilaterally.  Gastrointestinal:   Abdominal Examination:  Abdomen nontender to palpation, tone normal without rigidity or guarding, no masses present, umbilicus without lesions   Liver and Spleen:  No hepatomegaly present, liver nontender to palpation    Hernias:  No hernias present  Lymphatic: Lymph Nodes:  No other lymphadenopathy present  Skin:  General Inspection:  No rashes present, no lesions present, no areas of  discoloration  Neurologic:    Mental Status:  Oriented X3.  Normal strength and tone, sensory exam                grossly normal, mentation intact and speech normal.    Psychiatric:   Mentation appears normal and affect normal/bright.         Pelvic Exam:  External Genitalia:     Normal appearance for age, no discharge present, no tenderness present, no inflammatory lesions present, color normal  Vagina:     Normal vaginal vault without central or paravaginal defects, no discharge present, no inflammatory lesions present, no masses present  Bladder:     Nontender to palpation  Urethra:   Urethral Body:  Urethra palpation normal, urethra structural support normal   Urethral Meatus:  No erythema or lesions present  Cervix:     Appearance healthy, no lesions present, nontender to palpation, no bleeding present  Uterus:     Uterus: firm, normal sized and nontender, anteverted in position.   Adnexa:     No adnexal tenderness present, no adnexal masses present  Perineum:     Perineum within normal limits, no evidence of trauma, no rashes or skin lesions present  Anus:     Anus within normal limits, no hemorrhoids present  Inguinal Lymph Nodes:     No lymphadenopathy present  Pubic Hair:     Normal pubic hair distribution for age  Genitalia and Groin:     No rashes present, no lesions present, no areas of discoloration, no masses  present      COUNSELING:   Reviewed preventive health counseling, as reflected in patient instructions    BMI: Body mass index is 22.11 kg/m .      ASSESSMENT:  37 year old female with satisfactory annual exam.    ICD-10-CM    1. Screen for STD (sexually transmitted disease)  Z11.3 Chlamydia trachomatis/Neisseria gonorrhoeae by PCR - Clinic Collect      2. Screening for chlamydial disease  Z11.8 Chlamydia trachomatis/Neisseria gonorrhoeae by PCR - Clinic Collect      3. Screening for gonorrhea  Z11.3 Chlamydia trachomatis/Neisseria gonorrhoeae by PCR - Clinic Collect      4. Encounter for screening for HIV  Z11.4       5. Encounter for initial prescription of contraceptive pills  Z30.011 norgestim-eth estrad triphasic (ORTHO TRI-CYCLEN LO) 0.18/0.215/0.25 MG-25 MCG tablet      6. Encounter for gynecological examination without abnormal finding  Z01.419       7. Screening for metabolic disorder  Z13.228 Comprehensive metabolic panel     Comprehensive metabolic panel      8. Screening for thyroid disorder  Z13.29 TSH with Free T4 Reflex     TSH with Free T4 Reflex      9. Encounter for vitamin deficiency screening  Z13.21 Vitamin D Deficiency     Vitamin D Deficiency      10. Encounter for lipid screening for cardiovascular disease  Z13.220 Lipid panel reflex to direct LDL Fasting    Z13.6 Lipid panel reflex to direct LDL Fasting          PLAN:  Repeat labs for known elevated lipids  Other screening labs today as well   Ok to continue OCPS.    Kayce Beatty MD

## 2022-12-06 ENCOUNTER — OFFICE VISIT (OUTPATIENT)
Dept: OBGYN | Facility: CLINIC | Age: 37
End: 2022-12-06
Payer: COMMERCIAL

## 2022-12-06 VITALS
BODY MASS INDEX: 22.28 KG/M2 | WEIGHT: 118 LBS | HEIGHT: 61 IN | HEART RATE: 82 BPM | DIASTOLIC BLOOD PRESSURE: 56 MMHG | SYSTOLIC BLOOD PRESSURE: 110 MMHG

## 2022-12-06 DIAGNOSIS — Z13.21 ENCOUNTER FOR VITAMIN DEFICIENCY SCREENING: ICD-10-CM

## 2022-12-06 DIAGNOSIS — Z13.220 ENCOUNTER FOR LIPID SCREENING FOR CARDIOVASCULAR DISEASE: ICD-10-CM

## 2022-12-06 DIAGNOSIS — Z11.3 SCREENING FOR GONORRHEA: ICD-10-CM

## 2022-12-06 DIAGNOSIS — Z13.29 SCREENING FOR THYROID DISORDER: ICD-10-CM

## 2022-12-06 DIAGNOSIS — Z01.419 ENCOUNTER FOR GYNECOLOGICAL EXAMINATION WITHOUT ABNORMAL FINDING: ICD-10-CM

## 2022-12-06 DIAGNOSIS — Z13.6 ENCOUNTER FOR LIPID SCREENING FOR CARDIOVASCULAR DISEASE: ICD-10-CM

## 2022-12-06 DIAGNOSIS — Z30.011 ENCOUNTER FOR INITIAL PRESCRIPTION OF CONTRACEPTIVE PILLS: ICD-10-CM

## 2022-12-06 DIAGNOSIS — Z11.4 ENCOUNTER FOR SCREENING FOR HIV: ICD-10-CM

## 2022-12-06 DIAGNOSIS — Z11.8 SCREENING FOR CHLAMYDIAL DISEASE: ICD-10-CM

## 2022-12-06 DIAGNOSIS — Z13.228 SCREENING FOR METABOLIC DISORDER: ICD-10-CM

## 2022-12-06 DIAGNOSIS — Z11.3 SCREEN FOR STD (SEXUALLY TRANSMITTED DISEASE): Primary | ICD-10-CM

## 2022-12-06 LAB
ALBUMIN SERPL BCG-MCNC: 4.2 G/DL (ref 3.5–5.2)
ALP SERPL-CCNC: 47 U/L (ref 35–104)
ALT SERPL W P-5'-P-CCNC: 17 U/L (ref 10–35)
ANION GAP SERPL CALCULATED.3IONS-SCNC: 13 MMOL/L (ref 7–15)
AST SERPL W P-5'-P-CCNC: 24 U/L (ref 10–35)
BILIRUB SERPL-MCNC: 0.3 MG/DL
BUN SERPL-MCNC: 14.3 MG/DL (ref 6–20)
CALCIUM SERPL-MCNC: 9.1 MG/DL (ref 8.6–10)
CHLORIDE SERPL-SCNC: 104 MMOL/L (ref 98–107)
CHOLEST SERPL-MCNC: 213 MG/DL
CREAT SERPL-MCNC: 0.84 MG/DL (ref 0.51–0.95)
DEPRECATED HCO3 PLAS-SCNC: 24 MMOL/L (ref 22–29)
GFR SERPL CREATININE-BSD FRML MDRD: >90 ML/MIN/1.73M2
GLUCOSE SERPL-MCNC: 74 MG/DL (ref 70–99)
HDLC SERPL-MCNC: 44 MG/DL
LDLC SERPL CALC-MCNC: 140 MG/DL
NONHDLC SERPL-MCNC: 169 MG/DL
POTASSIUM SERPL-SCNC: 3.6 MMOL/L (ref 3.4–5.3)
PROT SERPL-MCNC: 7.1 G/DL (ref 6.4–8.3)
SODIUM SERPL-SCNC: 141 MMOL/L (ref 136–145)
TRIGL SERPL-MCNC: 144 MG/DL
TSH SERPL DL<=0.005 MIU/L-ACNC: 3.14 UIU/ML (ref 0.3–4.2)

## 2022-12-06 PROCEDURE — 82306 VITAMIN D 25 HYDROXY: CPT | Performed by: OBSTETRICS & GYNECOLOGY

## 2022-12-06 PROCEDURE — 80053 COMPREHEN METABOLIC PANEL: CPT | Performed by: OBSTETRICS & GYNECOLOGY

## 2022-12-06 PROCEDURE — 87591 N.GONORRHOEAE DNA AMP PROB: CPT | Performed by: OBSTETRICS & GYNECOLOGY

## 2022-12-06 PROCEDURE — 99395 PREV VISIT EST AGE 18-39: CPT | Performed by: OBSTETRICS & GYNECOLOGY

## 2022-12-06 PROCEDURE — 36415 COLL VENOUS BLD VENIPUNCTURE: CPT | Performed by: OBSTETRICS & GYNECOLOGY

## 2022-12-06 PROCEDURE — 84443 ASSAY THYROID STIM HORMONE: CPT | Performed by: OBSTETRICS & GYNECOLOGY

## 2022-12-06 PROCEDURE — 87491 CHLMYD TRACH DNA AMP PROBE: CPT | Performed by: OBSTETRICS & GYNECOLOGY

## 2022-12-06 PROCEDURE — 80061 LIPID PANEL: CPT | Performed by: OBSTETRICS & GYNECOLOGY

## 2022-12-06 RX ORDER — NORGESTIMATE AND ETHINYL ESTRADIOL 7DAYSX3 LO
1 KIT ORAL DAILY
Qty: 84 TABLET | Refills: 4 | Status: SHIPPED | OUTPATIENT
Start: 2022-12-06 | End: 2024-01-24

## 2022-12-06 ASSESSMENT — ANXIETY QUESTIONNAIRES
GAD7 TOTAL SCORE: 0
3. WORRYING TOO MUCH ABOUT DIFFERENT THINGS: NOT AT ALL
1. FEELING NERVOUS, ANXIOUS, OR ON EDGE: NOT AT ALL
7. FEELING AFRAID AS IF SOMETHING AWFUL MIGHT HAPPEN: NOT AT ALL
GAD7 TOTAL SCORE: 0
5. BEING SO RESTLESS THAT IT IS HARD TO SIT STILL: NOT AT ALL
2. NOT BEING ABLE TO STOP OR CONTROL WORRYING: NOT AT ALL
IF YOU CHECKED OFF ANY PROBLEMS ON THIS QUESTIONNAIRE, HOW DIFFICULT HAVE THESE PROBLEMS MADE IT FOR YOU TO DO YOUR WORK, TAKE CARE OF THINGS AT HOME, OR GET ALONG WITH OTHER PEOPLE: NOT DIFFICULT AT ALL
6. BECOMING EASILY ANNOYED OR IRRITABLE: NOT AT ALL

## 2022-12-06 ASSESSMENT — LIFESTYLE VARIABLES
AUDIT-C TOTAL SCORE: 2
HOW OFTEN DO YOU HAVE SIX OR MORE DRINKS ON ONE OCCASION: NEVER
HOW MANY STANDARD DRINKS CONTAINING ALCOHOL DO YOU HAVE ON A TYPICAL DAY: 1 OR 2
HOW OFTEN DO YOU HAVE A DRINK CONTAINING ALCOHOL: 2-4 TIMES A MONTH
SKIP TO QUESTIONS 9-10: 1

## 2022-12-06 ASSESSMENT — PATIENT HEALTH QUESTIONNAIRE - PHQ9
SUM OF ALL RESPONSES TO PHQ QUESTIONS 1-9: 0
5. POOR APPETITE OR OVEREATING: NOT AT ALL

## 2022-12-07 LAB
C TRACH DNA SPEC QL PROBE+SIG AMP: NEGATIVE
DEPRECATED CALCIDIOL+CALCIFEROL SERPL-MC: 54 UG/L (ref 20–75)
N GONORRHOEA DNA SPEC QL NAA+PROBE: NEGATIVE

## 2023-03-21 ENCOUNTER — OFFICE VISIT (OUTPATIENT)
Dept: FAMILY MEDICINE | Facility: CLINIC | Age: 38
End: 2023-03-21
Payer: COMMERCIAL

## 2023-03-21 VITALS
OXYGEN SATURATION: 100 % | DIASTOLIC BLOOD PRESSURE: 81 MMHG | HEART RATE: 70 BPM | RESPIRATION RATE: 16 BRPM | WEIGHT: 116.8 LBS | TEMPERATURE: 98.1 F | BODY MASS INDEX: 22.05 KG/M2 | SYSTOLIC BLOOD PRESSURE: 129 MMHG | HEIGHT: 61 IN

## 2023-03-21 DIAGNOSIS — E78.2 MODERATE MIXED HYPERLIPIDEMIA NOT REQUIRING STATIN THERAPY: Primary | ICD-10-CM

## 2023-03-21 PROCEDURE — 99203 OFFICE O/P NEW LOW 30 MIN: CPT | Performed by: INTERNAL MEDICINE

## 2023-03-21 ASSESSMENT — PAIN SCALES - GENERAL: PAINLEVEL: NO PAIN (0)

## 2023-03-21 NOTE — PROGRESS NOTES
Assessment & Plan     Moderate mixed hyperlipidemia not requiring statin therapy  Discussed:  Keys to optimal physical and mental wellbein) Healthy food choices  *One of the most powerful steps you can take to improve your health, boost your energy levels, and prevent/improve chronic diseases is practicing a healthy diet based on fruits, vegetables, whole grains, legumes and healthy fats, while reducing highly refined or processed foods.   2) Move more, sit less  *Aim for 150 minutes per week of exercise at minimum (combination of aerobic and muscle strengthening activities). For example, 30 minutes of exercise 5 times a week.   Regular exercise is the single most effective known medical intervention for overall health promotion, helping blood pressure, cholesterol, blood sugar, mood, sleep , weight, memory and helping to prevent multiple cancers. Often, it is effective to build up to this level of activity gradually, and then be vigilant about maintaining it, giving it high priority. It can be helpful to try various things to see what you like and what works for you.   3) Stress management  4) Do not smoke.     No indication for medication at this time. We will follow-up in 6 months, sooner if new concerns arise.      Return in about 6 months (around 2023) for Follow up, with me, in person, sooner if symptoms worsen or do not improve.    Christine Perez [facility] BUCK Teixeira is a 37 year old, presenting for the following health issues:  Establish Care      History of Present Illness       Reason for visit:  Establish with PCP and address high cholesterol concerns    She eats 2-3 servings of fruits and vegetables daily.She consumes 1 sweetened beverage(s) daily.She exercises with enough effort to increase her heart rate 30 to 60 minutes per day.  She exercises with enough effort to increase her heart rate 3 or less days per week.   She is taking medications regularly.    "  Specialists: ob/gyn, pap UTD-on OCP  Nonsmoker    Discussed cholesterol results, historically above goal but fluctuates. She started working on more aerobic exercise, slight improvement. Follows healthy food choices. Not diabetic. No HTN.  Family hx: HLD in both parents;  father-CAD w/ stent placed this year age 60      Review of Systems   Constitutional, HEENT, cardiovascular, pulmonary, GI, , musculoskeletal, neuro, skin, endocrine and psych systems are negative, except as otherwise noted.      Objective    /81 (BP Location: Left arm, Patient Position: Sitting, Cuff Size: Adult Regular)   Pulse 70   Temp 98.1  F (36.7  C)   Resp 16   Ht 1.556 m (5' 1.25\")   Wt 53 kg (116 lb 12.8 oz)   SpO2 100%   BMI 21.89 kg/m    Body mass index is 21.89 kg/m .  Physical Exam     GENERAL APPEARANCE: AAOx3, no distress. Well developed.    PSYCH: appropriate mood and affect.                   "

## 2023-09-19 ENCOUNTER — OFFICE VISIT (OUTPATIENT)
Dept: FAMILY MEDICINE | Facility: CLINIC | Age: 38
End: 2023-09-19
Payer: COMMERCIAL

## 2023-09-19 VITALS
HEIGHT: 61 IN | TEMPERATURE: 97.5 F | BODY MASS INDEX: 21.81 KG/M2 | RESPIRATION RATE: 18 BRPM | DIASTOLIC BLOOD PRESSURE: 77 MMHG | WEIGHT: 115.5 LBS | HEART RATE: 67 BPM | SYSTOLIC BLOOD PRESSURE: 118 MMHG | OXYGEN SATURATION: 100 %

## 2023-09-19 DIAGNOSIS — E78.2 MODERATE MIXED HYPERLIPIDEMIA NOT REQUIRING STATIN THERAPY: Primary | ICD-10-CM

## 2023-09-19 LAB
BASOPHILS # BLD AUTO: 0 10E3/UL (ref 0–0.2)
BASOPHILS NFR BLD AUTO: 0 %
CHOLEST SERPL-MCNC: 211 MG/DL
EOSINOPHIL # BLD AUTO: 0.3 10E3/UL (ref 0–0.7)
EOSINOPHIL NFR BLD AUTO: 5 %
ERYTHROCYTE [DISTWIDTH] IN BLOOD BY AUTOMATED COUNT: 12.3 % (ref 10–15)
HCT VFR BLD AUTO: 40.5 % (ref 35–47)
HDLC SERPL-MCNC: 44 MG/DL
HGB BLD-MCNC: 13.2 G/DL (ref 11.7–15.7)
IMM GRANULOCYTES # BLD: 0 10E3/UL
IMM GRANULOCYTES NFR BLD: 0 %
LDLC SERPL CALC-MCNC: 141 MG/DL
LYMPHOCYTES # BLD AUTO: 3.2 10E3/UL (ref 0.8–5.3)
LYMPHOCYTES NFR BLD AUTO: 46 %
MCH RBC QN AUTO: 27.4 PG (ref 26.5–33)
MCHC RBC AUTO-ENTMCNC: 32.6 G/DL (ref 31.5–36.5)
MCV RBC AUTO: 84 FL (ref 78–100)
MONOCYTES # BLD AUTO: 0.4 10E3/UL (ref 0–1.3)
MONOCYTES NFR BLD AUTO: 6 %
NEUTROPHILS # BLD AUTO: 3 10E3/UL (ref 1.6–8.3)
NEUTROPHILS NFR BLD AUTO: 43 %
NONHDLC SERPL-MCNC: 167 MG/DL
PLATELET # BLD AUTO: 384 10E3/UL (ref 150–450)
RBC # BLD AUTO: 4.81 10E6/UL (ref 3.8–5.2)
TRIGL SERPL-MCNC: 131 MG/DL
WBC # BLD AUTO: 6.9 10E3/UL (ref 4–11)

## 2023-09-19 PROCEDURE — 36415 COLL VENOUS BLD VENIPUNCTURE: CPT | Performed by: INTERNAL MEDICINE

## 2023-09-19 PROCEDURE — 85025 COMPLETE CBC W/AUTO DIFF WBC: CPT | Performed by: INTERNAL MEDICINE

## 2023-09-19 PROCEDURE — 99213 OFFICE O/P EST LOW 20 MIN: CPT | Performed by: INTERNAL MEDICINE

## 2023-09-19 PROCEDURE — 80061 LIPID PANEL: CPT | Performed by: INTERNAL MEDICINE

## 2023-09-19 ASSESSMENT — PAIN SCALES - GENERAL: PAINLEVEL: NO PAIN (0)

## 2023-09-19 NOTE — PROGRESS NOTES
"  Assessment & Plan     Moderate mixed hyperlipidemia not requiring statin therapy  Update fasting labs  - CBC with Platelets & Differential  - Lipid panel reflex to direct LDL Fasting      Return in about 6 months (around 3/19/2024) for Follow up, with PCP, sooner if symptoms worsen or do not improve.      Christine Perez DO  [facility] BUCK Teixeira is a 37 year old, presenting for the following health issues:  Follow Up      History of Present Illness       Hyperlipidemia:  She presents for follow up of hyperlipidemia.   She is not taking medication to lower cholesterol. She is not having myalgia or other side effects to statin medications.She exercises with enough effort to increase her heart rate 9 or less minutes per day.  She exercises with enough effort to increase her heart rate 3 or less days per week.   She is taking medications regularly.     Here for follow-up on lipids  She admits more active due to new house, walking up and down stairs, yard work, etc. However not as consistent as she'd like to be due to death of grandparents and out of town trips. She is actively working on healthy food choices.    Defer flu shot today, she will plan to get through work next month    She is on tail end of a cold, feeling better, has been two weeks      Review of Systems   Constitutional, HEENT, cardiovascular, pulmonary, gi and gu systems are negative, except as otherwise noted.      Objective    /77 (BP Location: Left arm, Patient Position: Sitting, Cuff Size: Adult Regular)   Pulse 67   Temp 97.5  F (36.4  C) (Temporal)   Resp 18   Ht 1.556 m (5' 1.25\")   Wt 52.4 kg (115 lb 8 oz)   SpO2 100%   BMI 21.65 kg/m    Body mass index is 21.65 kg/m .  Physical Exam     GENERAL APPEARANCE: AAOx3, no distress. Well developed.    PSYCH: appropriate mood and affect.               "

## 2023-10-30 ENCOUNTER — MYC MEDICAL ADVICE (OUTPATIENT)
Dept: OBGYN | Facility: CLINIC | Age: 38
End: 2023-10-30
Payer: COMMERCIAL

## 2023-10-31 ENCOUNTER — OFFICE VISIT (OUTPATIENT)
Dept: URGENT CARE | Facility: URGENT CARE | Age: 38
End: 2023-10-31
Payer: COMMERCIAL

## 2023-10-31 VITALS
TEMPERATURE: 98.4 F | HEART RATE: 74 BPM | DIASTOLIC BLOOD PRESSURE: 69 MMHG | OXYGEN SATURATION: 100 % | SYSTOLIC BLOOD PRESSURE: 105 MMHG

## 2023-10-31 DIAGNOSIS — L08.9 INFECTED SEBACEOUS CYST: Primary | ICD-10-CM

## 2023-10-31 DIAGNOSIS — L72.3 INFECTED SEBACEOUS CYST: Primary | ICD-10-CM

## 2023-10-31 PROCEDURE — 99213 OFFICE O/P EST LOW 20 MIN: CPT

## 2023-10-31 RX ORDER — SULFAMETHOXAZOLE/TRIMETHOPRIM 800-160 MG
1 TABLET ORAL 2 TIMES DAILY
Qty: 14 TABLET | Refills: 0 | Status: SHIPPED | OUTPATIENT
Start: 2023-10-31 | End: 2023-11-07

## 2023-10-31 NOTE — PROGRESS NOTES
Chief Complaint   Patient presents with    Urgent Care     Painful ingrown hair abdomen x over the weekend got inflamed        ASSESSMENT/PLAN:  Lluvia was seen today for urgent care.    Diagnoses and all orders for this visit:    Infected sebaceous cyst  -     sulfamethoxazole-trimethoprim (BACTRIM DS) 800-160 MG tablet; Take 1 tablet by mouth 2 times daily for 7 days    No fluctuation or abscess at this time.  Nothing to be drained.  Discussed return precautions  Warm compresses  NSAIDs  Start bactrim    Nate Mackenzie PA-C      SUBJECTIVE:  Lluvia is a 38 year old female who presents to urgent care with 3 to 4 days of possible ingrown hair.  She developed pain, redness of her lower abdomen and there is some firmness there.  She had this once before needed medication.    ROS: Pertinent ROS neg other than the symptoms noted above in the HPI.     OBJECTIVE:  /69   Pulse 74   Temp 98.4  F (36.9  C) (Tympanic)   SpO2 100%    GENERAL: healthy, alert and no distress  EYES: Eyes grossly normal to inspection, PERRL and conjunctivae and sclerae normal  ABDOMEN: Under the umbilicus is a patch of erythema with underlying firm grape size mass with no fluctuation or pustular head    DIAGNOSTICS    No results found for any visits on 10/31/23.     Current Outpatient Medications   Medication    norgestim-eth estrad triphasic (ORTHO TRI-CYCLEN LO) 0.18/0.215/0.25 MG-25 MCG tablet     No current facility-administered medications for this visit.      Patient Active Problem List   Diagnosis    CARDIOVASCULAR SCREENING; LDL GOAL LESS THAN 160    Menometrorrhagia    Acne    Scoliosis    ASCUS with positive high risk HPV cervical    Moderate mixed hyperlipidemia not requiring statin therapy      Past Medical History:   Diagnosis Date    ASCUS with positive high risk HPV 08/13/2015    10/28/15 colp - neg    Cervical high risk HPV (human papillomavirus) test positive 2019, 2020    see problem list     Past Surgical History:    Procedure Laterality Date    NO HISTORY OF SURGERY       Family History   Problem Relation Age of Onset    No Known Problems Mother     No Known Problems Father     No Known Problems Sister     No Known Problems Brother     Alzheimer Disease Maternal Grandmother     Heart Disease Maternal Grandfather     Leukemia Maternal Grandfather     Chronic Obstructive Pulmonary Disease Maternal Grandfather     Heart Failure Maternal Grandfather     Hypertension Paternal Grandmother     Hyperlipidemia Paternal Grandmother     Heart Disease Paternal Grandfather     No Known Problems Other      Social History     Tobacco Use    Smoking status: Never    Smokeless tobacco: Never   Substance Use Topics    Alcohol use: Yes              The plan of care was discussed with the patient. They understand and agree with the course of treatment prescribed. A printed summary was given including instructions and medications.  The use of Dragon/Playfire dictation services may have been used to construct the content in this note; any grammatical or spelling errors are non-intentional. Please contact the author of this note directly if you are in need of any clarification.

## 2023-11-07 ENCOUNTER — OFFICE VISIT (OUTPATIENT)
Dept: URGENT CARE | Facility: URGENT CARE | Age: 38
End: 2023-11-07
Payer: COMMERCIAL

## 2023-11-07 VITALS
SYSTOLIC BLOOD PRESSURE: 107 MMHG | TEMPERATURE: 99 F | DIASTOLIC BLOOD PRESSURE: 64 MMHG | HEART RATE: 79 BPM | OXYGEN SATURATION: 99 %

## 2023-11-07 DIAGNOSIS — L08.9 INFECTED SEBACEOUS CYST: Primary | ICD-10-CM

## 2023-11-07 DIAGNOSIS — L72.3 INFECTED SEBACEOUS CYST: Primary | ICD-10-CM

## 2023-11-07 PROCEDURE — 99213 OFFICE O/P EST LOW 20 MIN: CPT | Mod: 25

## 2023-11-07 PROCEDURE — 10060 I&D ABSCESS SIMPLE/SINGLE: CPT

## 2023-11-07 RX ORDER — IBUPROFEN 200 MG
400 TABLET ORAL ONCE
Status: COMPLETED | OUTPATIENT
Start: 2023-11-07 | End: 2023-11-07

## 2023-11-07 RX ORDER — CEPHALEXIN 500 MG/1
500 CAPSULE ORAL 4 TIMES DAILY
Qty: 28 CAPSULE | Refills: 0 | Status: SHIPPED | OUTPATIENT
Start: 2023-11-07 | End: 2023-11-14

## 2023-11-07 RX ADMIN — Medication 400 MG: at 11:36

## 2023-11-07 NOTE — PROGRESS NOTES
Chief Complaint   Patient presents with    Urgent Care     Seen 10/31 for sebaceous cyst prescribed bactrim not effective - cyst has not gone away        ASSESSMENT/PLAN:  Lluvia was seen today for urgent care.    Diagnoses and all orders for this visit:    Infected sebaceous cyst  -     Adult General Surg Referral; Future  -     ibuprofen (ADVIL/MOTRIN) tablet 400 mg  -     cephALEXin (KEFLEX) 500 MG capsule; Take 1 capsule (500 mg) by mouth 4 times daily for 7 days  -     DRAIN SKIN ABSCESS SIMPLE/SINGLE    Discussed I&D procedure today and agreed to move forward.  There was some small fluctuance indicative of an abscess.  Procedure as below.  Patient bandaged.  Recommend follow-up with GEN surg for definitive cyst removal.    Change the bandage once a day.  Warm clean water is fine but do not soak it.  Apply antibacterial ointment.  If you develop purulent discharge, surrounding redness, swelling or tenderness start the Keflex.  Continue using Tylenol and ibuprofen as needed for pain    Procedure:  The area was cleaned with Betadine and alcohol swab, 3 cc of 1% lidocaine with epinephrine was used to anesthetize the area.  11 blade was used to make a small incision over the area of maximal fluctuance.  A small amount of purulent discharge was expressed, a hemostat was used to try and break up any loculations followed by an 11 blade but it was just firm tissue and no sebaceous material or purulence was further expressed.  The area was then bandaged appropriately    Nate Mackenzie PA-C      SUBJECTIVE:  Lluvia is a 38 year old female who presents to urgent care with continued growth of the cyst she was seen for a week ago.  She was placed on Bactrim for infected sebaceous cyst.  The pain is improved but the size of the cyst has grown.  She had some skin slough off over the top.    ROS: Pertinent ROS neg other than the symptoms noted above in the HPI.     OBJECTIVE:  /64   Pulse 79   Temp 99  F (37.2  C)  (Tympanic)   SpO2 99%    GENERAL: healthy, alert and no distress  SKIN: Palpable mass below the umbilicus with surrounding induration, small area of fluctuance over the middle, palpable firm mass extending approximately 2 to 3 cm in length    DIAGNOSTICS    No results found for any visits on 11/07/23.     Current Outpatient Medications   Medication    norgestim-eth estrad triphasic (ORTHO TRI-CYCLEN LO) 0.18/0.215/0.25 MG-25 MCG tablet    sulfamethoxazole-trimethoprim (BACTRIM DS) 800-160 MG tablet     No current facility-administered medications for this visit.      Patient Active Problem List   Diagnosis    CARDIOVASCULAR SCREENING; LDL GOAL LESS THAN 160    Menometrorrhagia    Acne    Scoliosis    ASCUS with positive high risk HPV cervical    Moderate mixed hyperlipidemia not requiring statin therapy      Past Medical History:   Diagnosis Date    ASCUS with positive high risk HPV 08/13/2015    10/28/15 colp - neg    Cervical high risk HPV (human papillomavirus) test positive 2019, 2020    see problem list     Past Surgical History:   Procedure Laterality Date    NO HISTORY OF SURGERY       Family History   Problem Relation Age of Onset    No Known Problems Mother     No Known Problems Father     No Known Problems Sister     No Known Problems Brother     Alzheimer Disease Maternal Grandmother     Heart Disease Maternal Grandfather     Leukemia Maternal Grandfather     Chronic Obstructive Pulmonary Disease Maternal Grandfather     Heart Failure Maternal Grandfather     Hypertension Paternal Grandmother     Hyperlipidemia Paternal Grandmother     Heart Disease Paternal Grandfather     No Known Problems Other      Social History     Tobacco Use    Smoking status: Never    Smokeless tobacco: Never   Substance Use Topics    Alcohol use: Yes              The plan of care was discussed with the patient. They understand and agree with the course of treatment prescribed. A printed summary was given including instructions  and medications.  The use of Dragon/Eyenalyze dictation services may have been used to construct the content in this note; any grammatical or spelling errors are non-intentional. Please contact the author of this note directly if you are in need of any clarification.

## 2023-11-07 NOTE — PATIENT INSTRUCTIONS
Change the bandage once a day.  Warm clean water is fine but do not soak it.  Apply antibacterial ointment.  If you develop purulent discharge, surrounding redness, swelling or tenderness start the Keflex.  Continue using Tylenol and ibuprofen as needed for pain    Follow-up with general surgery for definitive removal

## 2023-11-13 ENCOUNTER — TELEPHONE (OUTPATIENT)
Dept: FAMILY MEDICINE | Facility: CLINIC | Age: 38
End: 2023-11-13
Payer: COMMERCIAL

## 2023-11-13 NOTE — TELEPHONE ENCOUNTER
Pt was seen at  11/07/2023 for an infected cyst. Pt has scheduled surgeon consult appt on 11/30.    Pt wants to know for how long she should be bandaging incision and avoid getting it wet. She has also been using neosporin as directed.      Pt denies a new signs of infection. She will need a call back with providers advice.     Future Appointments 11/13/2023 - 5/11/2024        Date Visit Type Length Department Provider     11/30/2023  9:30 AM NEW 15 min  SURGICAL CONSULT Xavier Salas MD    Location Instructions:     Our office is located at [address] [facility].Park in the [address]ing Ramp on the NW corner of [address] [facility].Walk across the skyway veering to the left as you approach the stairway.You will see a bank of elevators to your left just past the restrooms.Take the elevator to the 4th floor.We are in [address].              4/8/2024 10:00 AM OFFICE VISIT 30 min  FAMILY PRAC/Padmini Rios MD    Location Instructions:     [facility] is in [address] of the [facility] at [address] This is just south of [facility] and the [facility] exit off of [address]. Free parking is available; access the lot by turning east from [facility] onto [address] [place] [address]. Through the main entrance, the clinic is directly to the left.

## 2023-11-14 NOTE — TELEPHONE ENCOUNTER
I am going to forward this to the provider that saw her and drained the cyst.  Per his note it didn't sound too large.   Zelda Edmondson RN, CNP

## 2023-11-15 NOTE — TELEPHONE ENCOUNTER
Change dressing 1-2 x a day as needed until wound has healed. Once scab forms she can get it wet in shower but recommend not bathing until scab has fully healed    Sent patient MyChart message

## 2023-11-30 ENCOUNTER — TELEPHONE (OUTPATIENT)
Dept: SURGERY | Facility: CLINIC | Age: 38
End: 2023-11-30

## 2023-11-30 ENCOUNTER — OFFICE VISIT (OUTPATIENT)
Dept: SURGERY | Facility: CLINIC | Age: 38
End: 2023-11-30
Attending: PHYSICIAN ASSISTANT
Payer: COMMERCIAL

## 2023-11-30 VITALS
BODY MASS INDEX: 21.71 KG/M2 | HEIGHT: 61 IN | DIASTOLIC BLOOD PRESSURE: 70 MMHG | HEART RATE: 99 BPM | SYSTOLIC BLOOD PRESSURE: 110 MMHG | WEIGHT: 115 LBS

## 2023-11-30 DIAGNOSIS — L72.3 INFECTED SEBACEOUS CYST: ICD-10-CM

## 2023-11-30 DIAGNOSIS — L08.9 INFECTED SEBACEOUS CYST: ICD-10-CM

## 2023-11-30 PROCEDURE — 99242 OFF/OP CONSLTJ NEW/EST SF 20: CPT | Performed by: SURGERY

## 2023-11-30 NOTE — PROGRESS NOTES
"[facility]  Surgery Consultation    PCP:  Christine Perez [phone]  Consultation requested by: Nate Mackenzie PA-C     HPI: Patient is a 38-year-old female referred by the above provider for consultation regarding an infected sebaceous cyst.  She was seen in urgent care on October 31 due to what she felt was an infected ingrown hair.  She was at that time started on antibiotics.  After taking the antibiotic she felt like this had not improved significantly and represented to urgent care on November 7.  At that time she underwent incision and drainage.  She continued and completed a course of antibiotics.  She can still feel a small lump in the area.  She has not had any fevers or chills.  Reports no ongoing drainage.  Has continued to take mostly sponge baths and kept a dressing over it.    PMH:   has a past medical history of ASCUS with positive high risk HPV (08/13/2015) and Cervical high risk HPV (human papillomavirus) test positive (2019, 2020).  PSH:    has a past surgical history that includes no history of surgery.  Social History:   reports that she has never smoked. She has never used smokeless tobacco. She reports current alcohol use. She reports that she does not use drugs.  Family History:   family history includes Alzheimer Disease in her maternal grandmother; Chronic Obstructive Pulmonary Disease in her maternal grandfather; Heart Disease in her maternal grandfather and paternal grandfather; Heart Failure in her maternal grandfather; Hyperlipidemia in her paternal grandmother; Hypertension in her paternal grandmother; Leukemia in her maternal grandfather; No Known Problems in her brother, father, mother, sister, and another family member.  Medications/Allergies: Home medications and allergies reviewed.    ROS:  The 10 point Review of Systems is negative other than noted in the HPI.    Physical Exam:  /70   Pulse 99   Ht 1.549 m (5' 1\")   Wt 52.2 kg (115 lb)   BMI 21.73 " kg/m    GENERAL: Generally appears well.  Psych: Alert and Oriented.  Normal affect  Eyes: Sclera clear  Respiratory: Patient wearing a mask stating she has a slight cold.  That said her respiratory effort seems normal.  No audible wheezes.  Integumentary:  No rashes, on the lower abdomen underneath the Band-Aid there is a small healed pinpoint incision and drainage site.  Some mild reactive erythema around it.  No fluctuance.  Slight palpable abnormality immediately underneath the incision.  Neurological: grossly intact      All new lab and imaging data was reviewed.     Impression and Plan:  Patient is a 38 year old female with infected sebaceous cyst status post I&D.  Now appears mostly healed.    PLAN: We discussed her management options.  Would recommend excision of the area to prevent recurrence.  This can be scheduled under local anesthetic at her convenience.      Thank you very much for this consult.    Xavier Salas M.D.  [facility]  [phone]    Please route or send letter to:  Primary Care Provider (PCP) and Referring Provider

## 2023-11-30 NOTE — TELEPHONE ENCOUNTER
Orders received for cyst excision with Dr. Xavier Salas.       Left message for patient to call me at their convenience to schedule surgery.     Carlie PAREDES    Surgery Coordinator  [facility]  [facility]  [phone]

## 2023-12-05 ENCOUNTER — TELEPHONE (OUTPATIENT)
Dept: SURGERY | Facility: CLINIC | Age: 38
End: 2023-12-05
Payer: COMMERCIAL

## 2023-12-05 NOTE — TELEPHONE ENCOUNTER
Type of surgery: Excision of abdominal wall sebaceous cyst  Location of surgery: [facility]  Date and time of surgery: 1/8/24 at 10:30am  Surgeon: Dr. Xavier Salas  Pre-Op Appt Date: patient to schedule  Post-Op Appt Date: patient to schedule   Packet sent out: Yes  Pre-cert/Authorization completed:  Not Applicable  Date: 12/5/23

## 2024-01-03 ENCOUNTER — TELEPHONE (OUTPATIENT)
Dept: SURGERY | Facility: CLINIC | Age: 39
End: 2024-01-03
Payer: COMMERCIAL

## 2024-01-03 NOTE — TELEPHONE ENCOUNTER
Planned procedure: excision of abdominal wall sebaceous cyst    Date: 01/08/2024    Surgeon: Josue    Patient wondering if she has to wash her hair the night before and the day of surgery? Informed her that she does not have to wash it the morning of. But can, if she wants. She should use her normal shampoo when washing her hair, not the surgical soap    Do not apply any type of body products morning of surgery    All questions answered.     She will call DANAE Soto RN-BSN

## 2024-01-03 NOTE — TELEPHONE ENCOUNTER
Name of caller: Patient    Reason for Call:  patient has a few general questions about what she can and cannot do and use prior to her surgery.     Surgeon:  Dr. Salas     Recent Surgery:  No    If yes, when & what type:  scheduled for 1/8/24    Excision of abdominal wall sebaceous cyst       Best phone number to reach pt at is: [phone]    Ok to leave a message with medical info? Yes.

## 2024-01-08 ENCOUNTER — HOSPITAL ENCOUNTER (OUTPATIENT)
Facility: CLINIC | Age: 39
Discharge: HOME | End: 2024-01-08
Attending: SURGERY | Admitting: SURGERY
Payer: COMMERCIAL

## 2024-01-08 ENCOUNTER — APPOINTMENT (OUTPATIENT)
Dept: SURGERY | Facility: PHYSICIAN GROUP | Age: 39
End: 2024-01-08
Payer: COMMERCIAL

## 2024-01-08 VITALS
HEART RATE: 77 BPM | RESPIRATION RATE: 14 BRPM | SYSTOLIC BLOOD PRESSURE: 111 MMHG | OXYGEN SATURATION: 100 % | DIASTOLIC BLOOD PRESSURE: 72 MMHG

## 2024-01-08 PROCEDURE — 11401 EXC TR-EXT B9+MARG 0.6-1 CM: CPT | Performed by: SURGERY

## 2024-01-08 PROCEDURE — 250N000009 HC RX 250: Performed by: SURGERY

## 2024-01-08 PROCEDURE — 11402 EXC TR-EXT B9+MARG 1.1-2 CM: CPT | Performed by: SURGERY

## 2024-01-08 RX ORDER — LIDOCAINE HYDROCHLORIDE 10 MG/ML
10 INJECTION, SOLUTION EPIDURAL; INFILTRATION; INTRACAUDAL; PERINEURAL ONCE
Status: DISCONTINUED | OUTPATIENT
Start: 2024-01-08 | End: 2024-01-08 | Stop reason: HOSPADM

## 2024-01-08 NOTE — OP NOTE
Preoperative diagnosis: Sebaceous cyst lower mid abdomen    Postoperative diagnosis: Same    Procedure: Excision of 1 cm sebaceous cyst lower mid abdomen    Surgeon: Xavier Salas MD    Anesthesia: Local    Estimated blood loss: 2 cc    Specimens: Cyst examined and discarded with patient consent.    Indication for procedure: This 38-year-old female previously had incision and drainage of an infected sebaceous cyst in her mid lower abdomen.  There appeared to be persistent cyst present after this that healed.  We discussed management options.  It was elected proceed with excision.  The potential risks of bleeding, infection, recurrence were discussed.  Her questions were answered and she wished to proceed.    Procedure: After informed consent was obtained the patient was taken to the endoscopy department at [facility].  In one of the procedure room she was placed supine on the procedure table.  The area in question was sterilely prepped and draped in usual manner.  1% lidocaine with epinephrine was used to create a field block.  An elliptical incision was made around and including the skin incision that had been used to previously incised and drained this infected cyst.  This was excised then to normal-appearing subcutaneous tissues.  The incision was closed with interrupted deep dermal 4-0 Monocryl sutures.  Mastisol and Steri-Strips were applied.  This was tolerated well.  She left in a stable and ambulatory condition.    Xavier Salas MD

## 2024-01-24 DIAGNOSIS — Z30.011 ENCOUNTER FOR INITIAL PRESCRIPTION OF CONTRACEPTIVE PILLS: ICD-10-CM

## 2024-01-24 RX ORDER — NORGESTIMATE AND ETHINYL ESTRADIOL 7DAYSX3 LO
1 KIT ORAL DAILY
Qty: 28 TABLET | Refills: 0 | Status: SHIPPED | OUTPATIENT
Start: 2024-01-24 | End: 2024-02-12

## 2024-01-24 NOTE — TELEPHONE ENCOUNTER
Last Written Prescription Date:  12/6/22  Last Fill Quantity: 84,  # refills: 4   Last office visit: 12/6/2022 ; last virtual visit: Visit date not found with prescribing provider:  Kayce Beatty   Future Office Visit: 2/12/24  Next 5 appointments (look out 90 days)      Apr 08, 2024 10:00 AM  (Arrive by 9:40 AM)  Provider Visit with Padmini Thompson MD  [facility] ([facility] ) [address], [address]  [facility] [address]  [phone]             Requested Prescriptions   Pending Prescriptions Disp Refills    norgestim-eth estrad triphasic (ORTHO TRI-CYCLEN LO) 0.18/0.215/0.25 MG-25 MCG tablet 84 tablet 4     Sig: Take 1 tablet by mouth daily       Contraceptives Protocol Failed - 1/24/2024 11:13 AM        Failed - Recent (12 mo) or future (30 days) visit within the authorizing provider's specialty     The patient must have completed an in-person or virtual visit within the past 12 months or has a future visit scheduled within the next 90 days with the authorizing provider s specialty.  Urgent care and e-visits do not quality as an office visit for this protocol.          Passed - Patient is not a current smoker if age is 35 or older        Passed - Medication is active on med list        Passed - No active pregnancy on record        Passed - No positive pregnancy test in past 12 months           Pt is due for annual, is scheduled 2/12. Refill sent to get to pts next appt  Jennifer Sim RN on 1/24/2024 at 12:09 PM

## 2024-02-09 NOTE — PROGRESS NOTES
Lluvia is a 38 year old  female who presents for annual exam.     Besides routine health maintenance, she has no other health concerns today.    HPI:  Lluvia presents for an annual exam. She is in between PCPs as her PCP left . She experienced a lot of changes this year. She lost two grandparents, her sister had a child and she bought a house! She continues to struggle to find time to exercise. She has a strong family history of hyperlipidemia.      GYNECOLOGIC HISTORY:    Patient's last menstrual period was 2024.    Regular menses? yes  Menses every 28 days.  Length of menses: 4 days    Her current contraception method is: oral contraceptives.  She  reports that she has never smoked. She has never used smokeless tobacco.    Patient is sexually active.  STD testing offered?  Accepted    Last PHQ-9 score on record =       2024     8:51 AM   PHQ-9 SCORE   PHQ-9 Total Score 1     Last GAD7 score on record =       2024     8:51 AM   ILIR-7 SCORE   Total Score 1     Alcohol Score = 2    HEALTH MAINTENANCE:    Overdue       Never done ADVANCE CARE PLANNING (Every 5 Years)     OCT 27   1991 IPV IMMUNIZATION (4 of 4 - 4-dose series)  Last completed: Aug 3, 1989     Never done HIV SCREENING (Once)     Never done HEPATITIS C SCREENING (Once)     SEP 1   2023 COVID-19 Vaccine ( season)  Last completed: Sep 20, 2022     DEC 6   2023 YEARLY PREVENTIVE VISIT (Yearly)  Last completed: Dec 6, 2022     MYKE 1   2024 PHQ-2 (once per calendar year) (Yearly, January to December)  Last completed: Mar 21, 2023        Upcoming       AUG 30   2024 PAP FOLLOW-UP (Once)  Last completed: Aug 30, 2021     AUG 30   2024 HPV FOLLOW-UP (Once)  Last completed: Aug 30, 2021     SEP 19   2024 LIPID (Yearly)  Last completed: Sep 19, 2023     DEC 6   2025 GLUCOSE (Every 3 Years)  Last completed: Dec 6, 2022     JUL 30   2029 DTAP/TDAP/TD IMMUNIZATION (7 - Td or Tdap)  Last completed: 2019       Health  "maintenance updated:  yes    HISTORY:  OB History    Para Term  AB Living   0 0 0 0 0 0   SAB IAB Ectopic Multiple Live Births   0 0 0 0 0       Patient Active Problem List   Diagnosis    CARDIOVASCULAR SCREENING; LDL GOAL LESS THAN 160    Menometrorrhagia    Acne    Scoliosis    ASCUS with positive high risk HPV cervical    Moderate mixed hyperlipidemia not requiring statin therapy     Past Surgical History:   Procedure Laterality Date    CYST REMOVAL  2024      Social History     Tobacco Use    Smoking status: Never    Smokeless tobacco: Never   Substance Use Topics    Alcohol use: Yes      Problem (# of Occurrences) Relation (Name,Age of Onset)    Heart Failure (2) Maternal Grandfather, Paternal Grandmother (Barbara)    Alzheimer Disease (1) Maternal Grandmother    Heart Disease (2) Maternal Grandfather, Paternal Grandfather    Hypertension (1) Paternal Grandmother (Barbara)    Hyperlipidemia (1) Paternal Grandmother (Barbara)    Chronic Obstructive Pulmonary Disease (1) Maternal Grandfather    Leukemia (1) Maternal Grandfather    No Known Problems (5) Mother, Father, Sister, Brother, Other              Current Outpatient Medications   Medication Sig    norgestim-eth estrad triphasic (ORTHO TRI-CYCLEN LO) 0.18/0.215/0.25 MG-25 MCG tablet Take 1 tablet by mouth daily     No current facility-administered medications for this visit.     No Known Allergies    Past medical, surgical, social and family histories were reviewed and updated in EPIC.    ROS:     No urinary frequency or dysuria, bladder or kidney problems    EXAM:  /68   Ht 1.562 m (5' 1.5\")   Wt 54 kg (119 lb)   LMP 2024   BMI 22.12 kg/m     BMI: Body mass index is 22.12 kg/m .    PHYSICAL EXAM:  Constitutional:   Appearance: Well nourished, well developed, alert, in no acute distress  Neck:  Lymph Nodes:  No lymphadenopathy present    Thyroid:  Gland size normal, nontender, no nodules or masses present  on " palpation  Chest:  Respiratory Effort:  Breathing unlabored, CTAB  Cardiovascular:    Heart: Auscultation:  Regular rate, normal rhythm, no murmurs present  Breasts: Inspection of Breasts:  No lymphadenopathy present., Palpation of Breasts and Axillae:  No masses present on palpation, no breast tenderness., Axillary Lymph Nodes:  No lymphadenopathy present., and No nodularity, asymmetry or nipple discharge bilaterally.  Gastrointestinal:   Abdominal Examination:  Abdomen nontender to palpation, tone normal without rigidity or guarding, no masses present, umbilicus without lesions   Liver and Spleen:  No hepatomegaly present, liver nontender to palpation    Hernias:  No hernias present  Lymphatic: Lymph Nodes:  No other lymphadenopathy present  Skin:  General Inspection:  No rashes present, no lesions present, no areas of  discoloration  Neurologic:    Mental Status:  Oriented X3.  Normal strength and tone, sensory exam                grossly normal, mentation intact and speech normal.    Psychiatric:   Mentation appears normal and affect normal/bright.         Pelvic Exam:  External Genitalia:     Normal appearance for age, no discharge present, no tenderness present, no inflammatory lesions present, color normal  Vagina:     Normal vaginal vault without central or paravaginal defects, no discharge present, no inflammatory lesions present, no masses present  Bladder:     Nontender to palpation  Urethra:   Urethral Body:  Urethra palpation normal, urethra structural support normal   Urethral Meatus:  No erythema or lesions present  Cervix:     Appearance healthy, no lesions present, nontender to palpation, no bleeding present  Uterus:     Uterus: firm, normal sized and nontender, anteverted in position.   Adnexa:     No adnexal tenderness present, no adnexal masses present  Perineum:     Perineum within normal limits, no evidence of trauma, no rashes or skin lesions present  Anus:     Anus within normal limits, no  hemorrhoids present  Inguinal Lymph Nodes:     No lymphadenopathy present  Pubic Hair:     Normal pubic hair distribution for age  Genitalia and Groin:     No rashes present, no lesions present, no areas of discoloration, no masses present    COUNSELING:   Reviewed preventive health counseling, as reflected in patient instructions    BMI: Body mass index is 22.12 kg/m .      ASSESSMENT:  38 year old female with satisfactory annual exam.    ICD-10-CM    1. Encounter for gynecological examination without abnormal finding  Z01.419 Pap screen with HPV - recommended age 30 - 65 years      2. Encounter for initial prescription of contraceptive pills  Z30.011 norgestim-eth estrad triphasic (ORTHO TRI-CYCLEN LO) 0.18/0.215/0.25 MG-25 MCG tablet      3. Screen for STD (sexually transmitted disease)  Z11.3 NEISSERIA GONORRHOEA PCR      4. Screening for chlamydial disease  Z11.8 CHLAMYDIA TRACHOMATIS PCR          PLAN:  Normal exam today  Pap smear done  Discussed ways of ensuring adequate physical activity.  Labs deferred to PCP by Lluvia's request  Tolerating OCP well and ok to continue on Triphasic. Discussed the possibility of breakthrough bleeding and as she is not having that now she would like to continue on her current OCP.    Kayce Beatty MD

## 2024-02-12 ENCOUNTER — OFFICE VISIT (OUTPATIENT)
Dept: OBGYN | Facility: CLINIC | Age: 39
End: 2024-02-12
Payer: COMMERCIAL

## 2024-02-12 VITALS
WEIGHT: 119 LBS | SYSTOLIC BLOOD PRESSURE: 112 MMHG | DIASTOLIC BLOOD PRESSURE: 68 MMHG | HEIGHT: 62 IN | BODY MASS INDEX: 21.9 KG/M2

## 2024-02-12 DIAGNOSIS — Z11.3 SCREEN FOR STD (SEXUALLY TRANSMITTED DISEASE): ICD-10-CM

## 2024-02-12 DIAGNOSIS — Z11.8 SCREENING FOR CHLAMYDIAL DISEASE: ICD-10-CM

## 2024-02-12 DIAGNOSIS — Z30.011 ENCOUNTER FOR INITIAL PRESCRIPTION OF CONTRACEPTIVE PILLS: ICD-10-CM

## 2024-02-12 DIAGNOSIS — Z01.419 ENCOUNTER FOR GYNECOLOGICAL EXAMINATION WITHOUT ABNORMAL FINDING: Primary | ICD-10-CM

## 2024-02-12 PROCEDURE — 87491 CHLMYD TRACH DNA AMP PROBE: CPT | Performed by: OBSTETRICS & GYNECOLOGY

## 2024-02-12 PROCEDURE — 87591 N.GONORRHOEAE DNA AMP PROB: CPT | Performed by: OBSTETRICS & GYNECOLOGY

## 2024-02-12 PROCEDURE — 87624 HPV HI-RISK TYP POOLED RSLT: CPT | Performed by: OBSTETRICS & GYNECOLOGY

## 2024-02-12 PROCEDURE — G0145 SCR C/V CYTO,THINLAYER,RESCR: HCPCS | Performed by: OBSTETRICS & GYNECOLOGY

## 2024-02-12 PROCEDURE — 99395 PREV VISIT EST AGE 18-39: CPT | Performed by: OBSTETRICS & GYNECOLOGY

## 2024-02-12 RX ORDER — NORGESTIMATE AND ETHINYL ESTRADIOL 7DAYSX3 LO
1 KIT ORAL DAILY
Qty: 90 TABLET | Refills: 3 | Status: SHIPPED | OUTPATIENT
Start: 2024-02-12

## 2024-02-12 ASSESSMENT — PATIENT HEALTH QUESTIONNAIRE - PHQ9
5. POOR APPETITE OR OVEREATING: NOT AT ALL
SUM OF ALL RESPONSES TO PHQ QUESTIONS 1-9: 1

## 2024-02-12 ASSESSMENT — ANXIETY QUESTIONNAIRES
7. FEELING AFRAID AS IF SOMETHING AWFUL MIGHT HAPPEN: NOT AT ALL
1. FEELING NERVOUS, ANXIOUS, OR ON EDGE: NOT AT ALL
GAD7 TOTAL SCORE: 1
5. BEING SO RESTLESS THAT IT IS HARD TO SIT STILL: NOT AT ALL
IF YOU CHECKED OFF ANY PROBLEMS ON THIS QUESTIONNAIRE, HOW DIFFICULT HAVE THESE PROBLEMS MADE IT FOR YOU TO DO YOUR WORK, TAKE CARE OF THINGS AT HOME, OR GET ALONG WITH OTHER PEOPLE: NOT DIFFICULT AT ALL
6. BECOMING EASILY ANNOYED OR IRRITABLE: NOT AT ALL
GAD7 TOTAL SCORE: 1
3. WORRYING TOO MUCH ABOUT DIFFERENT THINGS: SEVERAL DAYS
2. NOT BEING ABLE TO STOP OR CONTROL WORRYING: NOT AT ALL

## 2024-02-13 LAB
C TRACH DNA SPEC QL NAA+PROBE: NEGATIVE
N GONORRHOEA DNA SPEC QL NAA+PROBE: NEGATIVE

## 2024-02-15 LAB
BKR LAB AP GYN ADEQUACY: NORMAL
BKR LAB AP GYN INTERPRETATION: NORMAL
BKR LAB AP HPV REFLEX: NORMAL
BKR LAB AP PREVIOUS ABNL DX: NORMAL
BKR LAB AP PREVIOUS ABNORMAL: NORMAL
PATH REPORT.COMMENTS IMP SPEC: NORMAL
PATH REPORT.COMMENTS IMP SPEC: NORMAL
PATH REPORT.RELEVANT HX SPEC: NORMAL

## 2024-04-03 ENCOUNTER — MYC MEDICAL ADVICE (OUTPATIENT)
Dept: FAMILY MEDICINE | Facility: CLINIC | Age: 39
End: 2024-04-03
Payer: COMMERCIAL

## 2024-04-04 NOTE — TELEPHONE ENCOUNTER
Labs can be done next day in fasting. Seeing her for the first time, I will order labs after meeting her.

## 2024-04-08 ENCOUNTER — OFFICE VISIT (OUTPATIENT)
Dept: FAMILY MEDICINE | Facility: CLINIC | Age: 39
End: 2024-04-08
Payer: COMMERCIAL

## 2024-04-08 VITALS
OXYGEN SATURATION: 98 % | TEMPERATURE: 98.1 F | BODY MASS INDEX: 22.12 KG/M2 | DIASTOLIC BLOOD PRESSURE: 64 MMHG | HEART RATE: 61 BPM | HEIGHT: 62 IN | RESPIRATION RATE: 16 BRPM | SYSTOLIC BLOOD PRESSURE: 114 MMHG

## 2024-04-08 DIAGNOSIS — M54.2 NECK PAIN: Primary | ICD-10-CM

## 2024-04-08 DIAGNOSIS — Z13.220 SCREENING FOR HYPERLIPIDEMIA: ICD-10-CM

## 2024-04-08 PROCEDURE — 99213 OFFICE O/P EST LOW 20 MIN: CPT | Performed by: INTERNAL MEDICINE

## 2024-04-08 NOTE — PROGRESS NOTES
"  Assessment & Plan     Neck pain  No red flags, most likely muscular.   Discussed about using ergonomic pillow.  Using heat or cold and stretches in the morning.   - Physical Therapy  Referral; Future    Screening for hyperlipidemia  Elevated LDL and total chol  Discussed risks of CVD.  - Lipid Profile; Future    See Patient Instructions    Subjective   Lluvia is a 38 year old, presenting for the following health issues:  Establish Care    History of Present Illness       Hyperlipidemia:  She presents for follow up of hyperlipidemia.   She is not taking medication to lower cholesterol. She is not having myalgia or other side effects to statin medications.    She eats 2-3 servings of fruits and vegetables daily.She consumes 1 sweetened beverage(s) daily.She exercises with enough effort to increase her heart rate 9 or less minutes per day.  She exercises with enough effort to increase her heart rate 4 days per week.   She is taking medications regularly.    Lluvia is here to establish care.   No medical conditions. No prescriptions meds except OCP.  Fam hx: paternal grandmother with CHF, maternal grandfather with CHF.   No B/O/C in 1st or 2nd degree relatives.   Nonsmoker  ETOH rarely.   Neck pain: started a few weeks ago, related to pillow, mostly on waking up, does stretches which helps.         Objective    /64 (BP Location: Left arm, Patient Position: Sitting, Cuff Size: Adult Regular)   Pulse 61   Temp 98.1  F (36.7  C)   Resp 16   Ht 1.562 m (5' 1.5\")   LMP 01/17/2024   SpO2 98%   BMI 22.12 kg/m    Body mass index is 22.12 kg/m .  Physical Exam           Signed Electronically by: Padmini Thompson MD    "

## 2024-04-11 ENCOUNTER — LAB (OUTPATIENT)
Dept: LAB | Facility: CLINIC | Age: 39
End: 2024-04-11
Payer: COMMERCIAL

## 2024-04-11 DIAGNOSIS — Z13.220 SCREENING FOR HYPERLIPIDEMIA: ICD-10-CM

## 2024-04-11 LAB
CHOLEST SERPL-MCNC: 242 MG/DL
FASTING STATUS PATIENT QL REPORTED: YES
HDLC SERPL-MCNC: 47 MG/DL
LDLC SERPL CALC-MCNC: 175 MG/DL
NONHDLC SERPL-MCNC: 195 MG/DL
TRIGL SERPL-MCNC: 101 MG/DL

## 2024-04-11 PROCEDURE — 80061 LIPID PANEL: CPT

## 2024-04-11 PROCEDURE — 36415 COLL VENOUS BLD VENIPUNCTURE: CPT

## 2024-04-26 ENCOUNTER — THERAPY VISIT (OUTPATIENT)
Dept: PHYSICAL THERAPY | Facility: CLINIC | Age: 39
End: 2024-04-26
Payer: COMMERCIAL

## 2024-04-26 DIAGNOSIS — M54.2 NECK PAIN: ICD-10-CM

## 2024-04-26 PROCEDURE — 97110 THERAPEUTIC EXERCISES: CPT | Mod: GP | Performed by: PHYSICAL THERAPIST

## 2024-04-26 PROCEDURE — 97161 PT EVAL LOW COMPLEX 20 MIN: CPT | Mod: GP | Performed by: PHYSICAL THERAPIST

## 2024-04-26 NOTE — PROGRESS NOTES
PHYSICAL THERAPY EVALUATION  Type of Visit: Evaluation    See electronic medical record for Abuse and Falls Screening details.    Subjective Has had neck and upper back pain for about 1 month. Woke up and it was tough to turn head. Usually wakes up with pain. Tries to do morning stretches (yoga miller) and that helps. Pain is in the middle of the spine. Is a  for [employer] [employer] [facility] [employer]. Home visits doesn't have the best posture. Nothing into the hands or arms.       Presenting condition or subjective complaint: Previously been struggling with a lot of neck and upper back pain.  Date of onset: 03/26/24    Relevant medical history: Migraines or headaches; Neck injury   Dates & types of surgery: 1/8- minor cyst removed from abdomen    Prior diagnostic imaging/testing results:       Prior therapy history for the same diagnosis, illness or injury: No      Prior Level of Function  Transfers: Independent  Ambulation: Independent  ADL: Independent    Living Environment  Social support: Alone   Type of home: House   Stairs to enter the home: Yes 4 Is there a railing: Yes   Ramp: No   Stairs inside the home: Yes 10 Is there a railing: Yes   Help at home: None  Equipment owned:       Employment: Yes social work  Hobbies/Interests: spending time with s/o; exercise; watching TV; reading; yard work    Patient goals for therapy: There are times it would hurt to turn my head, especially when driving and trying to change lanes.       Objective   CERVICAL SPINE EVALUATION  PAIN: Pain Level at Rest: 3/10  Pain Level with Use: 7/10  POSTURE: WNL  ROM:   (Degrees) Left AROM Right AROM    Cervical Flexion 38    Cervical Extension 53    Cervical Side bend 38 42    Cervical Rotation 80 78    Cervical Protrusion Not restricted    Cervical Retraction Moderately restricted    Thoracic Flexion Not restricted    Thoracic Extension Mildly restricted    Thoracic Rotation Mildly restricted Mildly restricted     Left AROM Left PROM Right AROM Right PROM    Shoulder Flexion Full and symmetrical  Full and symmetrical    Shoulder Extension       Shoulder Abduction Full and symmetrical  Full and symmetrical    Shoulder Adduction       Shoulder IR       Shoulder ER       Shoulder Horiz Abduction       Shoulder Horiz Adduction         MYOTOMES:    Left Right   C1-2 (Neck Flexion) 5 5   C3 (Neck Side Bend)  5 5   C4 (Shrug) 5 5   C5 (Deltoid) 5 5   C6 (Biceps) 5 5   C7 (Triceps) 5 5   C8 (Thumb Ext) 5 5   T1 (Intrinsics) 5 5     DERMATOMES: WNL  NEURAL TENSION: Cervical WNL  SPECIAL TESTS:    Left Right   Alar Ligament Negative    Cervical Flexion-Rotation     Cervical Rot/Lateral Flex     Compression     Distraction Positive Positive   Spurling s Positive Negative    Thoracic Outlet Screen (Ziyad, Adson)     Transverse Ligament Negative  Negative    Vertebral Artery Negative  Negative    Cotton Roll Test     Craniocervical Flexor Endurance Test     Mannheimer Test            PALPATION:   + Tenderness At Location Left Right   Sternocleidomastoid     Scalenes     Rhomboids     Facet     Upper Trap + +   Levator     Erector Spinae + +   Suboccipitals - -     SPINAL SEGMENTAL CONCLUSIONS: Hypomobility of C3-C6 with tenderness to palpation on left    Assessment & Plan   CLINICAL IMPRESSIONS  Medical Diagnosis: Neck pain    Treatment Diagnosis: Cervical spine pain   Impression/Assessment: Patient is a 38 year old female with Cervical spine and Thoracic spine complaints.  The following significant findings have been identified: Pain, Decreased ROM/flexibility, Decreased joint mobility, Decreased strength, and Decreased activity tolerance. These impairments interfere with their ability to perform work tasks and driving  as compared to previous level of function.     Clinical Decision Making (Complexity):  Clinical Presentation: Stable/Uncomplicated  Clinical Presentation Rationale: based on medical and personal factors listed in PT evaluation  Clinical Decision Making  (Complexity): Low complexity    PLAN OF CARE  Treatment Interventions:  Modalities: Cryotherapy, Hot Pack, Ultrasound  Interventions: Manual Therapy, Neuromuscular Re-education, Therapeutic Activity, Therapeutic Exercise, Self-Care/Home Management    Long Term Goals     PT Goal 1  Goal Identifier: Driving  Goal Description: Pt will be able to look over their shoulder to check their blind spot with a minimal increase in pain 1-2/10  Target Date: 06/21/24  PT Goal 2  Goal Identifier: Work  Goal Description: Pt will be able to complete a full shift at work with a minimal increase in pain 1-2/10.  Target Date: 06/21/24      Frequency of Treatment: 1 time per week  Duration of Treatment: 8 weeks    Recommended Referrals to Other Professionals:   Education Assessment:   Learner/Method: Patient;No Barriers to Learning    Risks and benefits of evaluation/treatment have been explained.   Patient/Family/caregiver agrees with Plan of Care.     Evaluation Time:     PT Eval, Low Complexity Minutes (35403): 20       Signing Clinician: Prakash Fritz PT

## 2024-04-30 ENCOUNTER — TELEPHONE (OUTPATIENT)
Dept: PHYSICAL THERAPY | Facility: CLINIC | Age: 39
End: 2024-04-30
Payer: COMMERCIAL

## 2024-04-30 DIAGNOSIS — M54.2 NECK PAIN: Primary | ICD-10-CM

## 2024-04-30 NOTE — TELEPHONE ENCOUNTER
Pt had some pain doing HEP. Advised patient to stop that particular exercise (Chair thoracic extension), use ice on the painful area, use a tennis ball against wall lightly for gentle massage and keep remainder of HEP exercises in a pain free rage. Patient verbalized understanding.

## 2024-05-08 ENCOUNTER — THERAPY VISIT (OUTPATIENT)
Dept: PHYSICAL THERAPY | Facility: CLINIC | Age: 39
End: 2024-05-08
Attending: INTERNAL MEDICINE
Payer: COMMERCIAL

## 2024-05-08 DIAGNOSIS — M54.2 NECK PAIN: Primary | ICD-10-CM

## 2024-05-08 PROCEDURE — 97110 THERAPEUTIC EXERCISES: CPT | Mod: GP | Performed by: PHYSICAL THERAPIST

## 2024-05-08 PROCEDURE — 97140 MANUAL THERAPY 1/> REGIONS: CPT | Mod: GP | Performed by: PHYSICAL THERAPIST

## 2024-05-17 ENCOUNTER — THERAPY VISIT (OUTPATIENT)
Dept: PHYSICAL THERAPY | Facility: CLINIC | Age: 39
End: 2024-05-17
Payer: COMMERCIAL

## 2024-05-17 DIAGNOSIS — M54.2 NECK PAIN: Primary | ICD-10-CM

## 2024-05-17 PROCEDURE — 97110 THERAPEUTIC EXERCISES: CPT | Mod: GP | Performed by: PHYSICAL THERAPIST

## 2024-05-17 PROCEDURE — 97140 MANUAL THERAPY 1/> REGIONS: CPT | Mod: GP | Performed by: PHYSICAL THERAPIST

## 2024-05-22 ENCOUNTER — THERAPY VISIT (OUTPATIENT)
Dept: PHYSICAL THERAPY | Facility: CLINIC | Age: 39
End: 2024-05-22
Payer: COMMERCIAL

## 2024-05-22 DIAGNOSIS — M54.2 NECK PAIN: Primary | ICD-10-CM

## 2024-05-22 PROCEDURE — 97110 THERAPEUTIC EXERCISES: CPT | Mod: GP | Performed by: PHYSICAL THERAPIST

## 2024-05-22 PROCEDURE — 97140 MANUAL THERAPY 1/> REGIONS: CPT | Mod: GP | Performed by: PHYSICAL THERAPIST

## 2024-05-29 ENCOUNTER — THERAPY VISIT (OUTPATIENT)
Dept: PHYSICAL THERAPY | Facility: CLINIC | Age: 39
End: 2024-05-29
Payer: COMMERCIAL

## 2024-05-29 DIAGNOSIS — M54.2 NECK PAIN: Primary | ICD-10-CM

## 2024-05-29 PROCEDURE — 97140 MANUAL THERAPY 1/> REGIONS: CPT | Mod: GP | Performed by: PHYSICAL THERAPIST

## 2024-05-29 PROCEDURE — 97110 THERAPEUTIC EXERCISES: CPT | Mod: GP | Performed by: PHYSICAL THERAPIST

## 2024-06-13 ENCOUNTER — THERAPY VISIT (OUTPATIENT)
Dept: PHYSICAL THERAPY | Facility: CLINIC | Age: 39
End: 2024-06-13
Payer: COMMERCIAL

## 2024-06-13 DIAGNOSIS — M54.2 NECK PAIN: Primary | ICD-10-CM

## 2024-06-13 PROCEDURE — 97140 MANUAL THERAPY 1/> REGIONS: CPT | Mod: GP | Performed by: PHYSICAL THERAPIST

## 2024-07-02 ENCOUNTER — THERAPY VISIT (OUTPATIENT)
Dept: PHYSICAL THERAPY | Facility: CLINIC | Age: 39
End: 2024-07-02
Payer: COMMERCIAL

## 2024-07-02 DIAGNOSIS — M54.2 NECK PAIN: Primary | ICD-10-CM

## 2024-07-02 PROCEDURE — 97110 THERAPEUTIC EXERCISES: CPT | Mod: GP | Performed by: PHYSICAL THERAPIST

## 2024-07-02 PROCEDURE — 97140 MANUAL THERAPY 1/> REGIONS: CPT | Mod: GP | Performed by: PHYSICAL THERAPIST

## 2024-07-02 NOTE — PROGRESS NOTES
PLAN  1 time every 2 weeks for 6 weeks    Beginning/End Dates of Progress Note Reporting Period:  04/26/24 to 07/02/2024    Referring Provider:  Padmini Thompson       07/02/24 0500   Appointment Info   Signing clinician's name / credentials Hardik Fritz, PT, DPT   Total/Authorized Visits 10 (POC) (E&T)   Visits Used 7   Medical Diagnosis Neck pain   PT Tx Diagnosis Cervical spine pain   Other pertinent information NEXT: Check T's and progress / Joint mobs / STM /   Progress Note/Certification   Onset of illness/injury or Date of Surgery 03/26/24   Therapy Frequency 1 time every 2 weeks   Predicted Duration 6 weeks   Progress Note Due Date 08/13/24   Progress Note Completed Date 04/26/24   GOALS   PT Goals 2;3   PT Goal 1   Goal Identifier Driving   Goal Description Pt will be able to look over their shoulder to check their blind spot with a minimal increase in pain 1-2/10   Target Date 06/21/24   Date Met 05/17/24   PT Goal 2   Goal Identifier Work   Goal Description Pt will be able to complete a full shift at work with a minimal increase in pain 1-2/10.   Target Date 06/21/24   Date Met 07/02/24   PT Goal 3   Goal Identifier Lifting   Goal Description Pt will be able to lift 5-10 pounds ovehread in order to complete functional tasks.   Rationale to maximize safety and independence with performance of ADLs and functional tasks;to maximize safety and independence within the home;to maximize safety and independence within the community   Target Date 08/13/24   Subjective Report   Subjective Report Was at the gym and used 5-8 pound weights and shoulder blade started hurting. Was doing a couple of overhead lifts. 1/10 pain right now. 80% improvement since beginning physical therapy.   Objective Measures   Objective Measures Objective Measure 1;Objective Measure 2;Objective Measure 3   Objective Measure 1   Objective Measure Palpation   Details Hypertonicity of levator scapulae on left   Objective Measure 2   Objective  Measure AROM   Details Flexion - 50, Extension - 65, SB R - 45 and L - 42, Rotation R - 80 and L - 70   Objective Measure 3   Objective Measure Cervical myotmoes   Details 5/5 B all   Treatment Interventions (PT)   Interventions Therapeutic Procedure/Exercise;Manual Therapy   Therapeutic Procedure/Exercise   Therapeutic Procedures: strength, endurance, ROM, flexibility minutes (78882) 11   Therapeutic Procedures Ther Proc 2;Ther Proc 3;Ther Proc 4;Ther Proc 5;Ther Proc 6;Ther Proc 7   Ther Proc 1 LS stretch for left side x60 seconds   Ther Proc 2 B ER at 90/90 x10 B with YTB   Ther Proc 3 T's x10 with 0# and 2x10 with 2#   Skilled Intervention Stretching exercise education to improve function   Patient Response/Progress No increase in discomfort noted   Manual Therapy   Manual Therapy: Mobilization, MFR, MLD, friction massage minutes (45491) 25   Manual Therapy Manual Therapy 2;Manual Therapy 3;Manual Therapy 4   Manual Therapy 1 PA T3-T10 mobilizations grades 1-3 to improve motion and reduce pain   Manual Therapy 2 STM thoracic paraspinals T3-T10 and levator scapulae on left to reduce tone and pain   Manual Therapy 3 Manual cervical spine traction to improve motion   Manual Therapy 4 Mid cervical downslides on left at C3-C6 to improve motion and reduce pain   Patient Response/Progress Tone reduction / Improved motion   Education   Learner/Method Patient;No Barriers to Learning   Plan   Home program PTRx (handout)   Comments   Comments Pt is doing well in physical therapy and states that she has seen an 80% improvement in her neck and upper back symptoms. Pt has met 2 of 3 functional goals currently. Pt still has difficulty with overhead lifting. Pt would benefit from continued skilled physical therapy in order to build on these gains and meet her remaining functional goal.   Total Session Time   Timed Code Treatment Minutes 36   Total Treatment Time (sum of timed and untimed services) 36

## 2024-07-16 ENCOUNTER — THERAPY VISIT (OUTPATIENT)
Dept: PHYSICAL THERAPY | Facility: CLINIC | Age: 39
End: 2024-07-16
Payer: COMMERCIAL

## 2024-07-16 DIAGNOSIS — M54.2 NECK PAIN: Primary | ICD-10-CM

## 2024-07-16 PROCEDURE — 97110 THERAPEUTIC EXERCISES: CPT | Mod: GP | Performed by: PHYSICAL THERAPIST

## 2024-07-16 PROCEDURE — 97140 MANUAL THERAPY 1/> REGIONS: CPT | Mod: GP | Performed by: PHYSICAL THERAPIST

## 2024-11-11 PROBLEM — M54.2 NECK PAIN: Status: RESOLVED | Noted: 2024-04-08 | Resolved: 2024-11-11

## 2025-01-13 ENCOUNTER — PATIENT OUTREACH (OUTPATIENT)
Dept: CARE COORDINATION | Facility: CLINIC | Age: 40
End: 2025-01-13
Payer: COMMERCIAL

## 2025-01-23 DIAGNOSIS — Z30.011 ENCOUNTER FOR INITIAL PRESCRIPTION OF CONTRACEPTIVE PILLS: ICD-10-CM

## 2025-01-23 RX ORDER — NORGESTIMATE AND ETHINYL ESTRADIOL 7DAYSX3 LO
1 KIT ORAL DAILY
Qty: 30 TABLET | Refills: 0 | Status: SHIPPED | OUTPATIENT
Start: 2025-01-23

## 2025-01-23 NOTE — TELEPHONE ENCOUNTER
Requested Prescriptions   Pending Prescriptions Disp Refills    norgestim-eth estrad triphasic (ORTHO TRI-CYCLEN LO) 0.18/0.215/0.25 MG-25 MCG tablet 90 tablet 3     Sig: Take 1 tablet by mouth daily.       Contraceptives Protocol Passed - 1/23/2025  3:38 PM        Passed - Patient is not a current smoker if age is 35 or older        Passed - Medication is active on med list        Passed - Recent (12 mo) or future (90 days) visit within the authorizing provider's specialty     The patient must have completed an in-person or virtual visit within the past 12 months or has a future visit scheduled within the next 90 days with the authorizing provider s specialty.  Urgent care and e-visits do not qualify as an office visit for this protocol.          Passed - Medication indicated for associated diagnosis     Medication is associated with one or more of the following diagnoses:  Contraception  Acne  Dysmenorrhea  Menorrhagia  Amenorrhea  PCOS  Premenstrual Dysphoric Disorder  Irregular menses  Endometriosis  Contraceptive counseling  Finding of menstrual bleeding  Education about oral contraception  Uses contraception  Initial prescription of oral contraception  Oral contraception-no problem  Oral contraceptive repeat          Passed - No active pregnancy on record        Passed - No positive pregnancy test in past 12 months           Last Written Prescription Date:  02/12/24  Last Fill Quantity: 90,  # refills: 3   Last office visit: 2/12/2024 with prescribing provider:  Dr Kayce Beatty   Future Office Visit:  none    One month refill approved  Appointment needed for further refills  Anna Nevarez RN on 1/23/2025 at 3:47 PM

## 2025-01-27 ENCOUNTER — PATIENT OUTREACH (OUTPATIENT)
Dept: CARE COORDINATION | Facility: CLINIC | Age: 40
End: 2025-01-27
Payer: COMMERCIAL

## 2025-03-30 ENCOUNTER — HEALTH MAINTENANCE LETTER (OUTPATIENT)
Age: 40
End: 2025-03-30

## (undated) DEVICE — ADH LIQUID MASTISOL TOPICAL VIAL 2-3ML 0523-48

## (undated) DEVICE — DRSG STERI STRIP 1/2X4" R1547

## (undated) DEVICE — SU MONOCRYL 4-0 PS-2 18" UND Y496G